# Patient Record
Sex: FEMALE | Race: WHITE | Employment: PART TIME | URBAN - METROPOLITAN AREA
[De-identification: names, ages, dates, MRNs, and addresses within clinical notes are randomized per-mention and may not be internally consistent; named-entity substitution may affect disease eponyms.]

---
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
## 2023-03-10 LAB
ABO GROUP (TYPE) IN BLOOD: NORMAL
ANTIBODY SCREEN: NORMAL
ERYTHROCYTE DISTRIBUTION WIDTH (RATIO) BY AUTOMATED COUNT: 12.2 % (ref 11.5–14.5)
ERYTHROCYTE MEAN CORPUSCULAR HEMOGLOBIN CONCENTRATION (G/DL) BY AUTOMATED: 33.7 G/DL (ref 32–36)
ERYTHROCYTE MEAN CORPUSCULAR VOLUME (FL) BY AUTOMATED COUNT: 98 FL (ref 80–100)
ERYTHROCYTES (10*6/UL) IN BLOOD BY AUTOMATED COUNT: 4.14 X10E12/L (ref 4–5.2)
HEMATOCRIT (%) IN BLOOD BY AUTOMATED COUNT: 40.4 % (ref 36–46)
HEMOGLOBIN (G/DL) IN BLOOD: 13.6 G/DL (ref 12–16)
HEPATITIS B VIRUS SURFACE AG PRESENCE IN SERUM: NONREACTIVE
HEPATITIS C VIRUS AB PRESENCE IN SERUM: NONREACTIVE
HIV 1/ 2 AG/AB SCREEN: NONREACTIVE
LEUKOCYTES (10*3/UL) IN BLOOD BY AUTOMATED COUNT: 9.7 X10E9/L (ref 4.4–11.3)
PLATELETS (10*3/UL) IN BLOOD AUTOMATED COUNT: 257 X10E9/L (ref 150–450)
REFLEX ADDED, ANEMIA PANEL: NORMAL
RH FACTOR: NORMAL
RUBELLA VIRUS IGG AB: POSITIVE
SYPHILIS TOTAL AB: NONREACTIVE

## 2023-03-13 LAB
HEMOGLOBIN A2: 3.1 %
HEMOGLOBIN A: 96.3 %
HEMOGLOBIN F: 0.6 %
HEMOGLOBIN IDENTIFICATION INTERPRETATION: NORMAL
PATH REVIEW-HGB IDENTIFICATION: NORMAL

## 2023-04-19 ENCOUNTER — APPOINTMENT (OUTPATIENT)
Dept: LAB | Facility: LAB | Age: 18
End: 2023-04-19
Payer: COMMERCIAL

## 2023-04-22 LAB — LAB MOLECULAR CA TECHNICAL NOTES: NORMAL

## 2023-07-26 LAB
ERYTHROCYTE DISTRIBUTION WIDTH (RATIO) BY AUTOMATED COUNT: 12.4 % (ref 11.5–14.5)
ERYTHROCYTE MEAN CORPUSCULAR HEMOGLOBIN CONCENTRATION (G/DL) BY AUTOMATED: 34.2 G/DL (ref 32–36)
ERYTHROCYTE MEAN CORPUSCULAR VOLUME (FL) BY AUTOMATED COUNT: 97 FL (ref 80–100)
ERYTHROCYTES (10*6/UL) IN BLOOD BY AUTOMATED COUNT: 3.75 X10E12/L (ref 4–5.2)
GLUCOSE, 1 HR SCREEN, PREG: 101 MG/DL
HEMATOCRIT (%) IN BLOOD BY AUTOMATED COUNT: 36.5 % (ref 36–46)
HEMOGLOBIN (G/DL) IN BLOOD: 12.5 G/DL (ref 12–16)
LEUKOCYTES (10*3/UL) IN BLOOD BY AUTOMATED COUNT: 11.2 X10E9/L (ref 4.4–11.3)
PLATELETS (10*3/UL) IN BLOOD AUTOMATED COUNT: 216 X10E9/L (ref 150–450)
REFLEX ADDED, ANEMIA PANEL: ABNORMAL
SYPHILIS TOTAL AB: NONREACTIVE

## 2023-10-06 ENCOUNTER — ROUTINE PRENATAL (OUTPATIENT)
Dept: OBSTETRICS AND GYNECOLOGY | Facility: CLINIC | Age: 18
End: 2023-10-06
Payer: COMMERCIAL

## 2023-10-06 VITALS — SYSTOLIC BLOOD PRESSURE: 118 MMHG | BODY MASS INDEX: 37.11 KG/M2 | DIASTOLIC BLOOD PRESSURE: 60 MMHG | WEIGHT: 190 LBS

## 2023-10-06 DIAGNOSIS — Z34.03 ENCOUNTER FOR SUPERVISION OF NORMAL FIRST PREGNANCY IN THIRD TRIMESTER (HHS-HCC): Primary | ICD-10-CM

## 2023-10-06 DIAGNOSIS — Z3A.37 37 WEEKS GESTATION OF PREGNANCY (HHS-HCC): ICD-10-CM

## 2023-10-06 PROBLEM — F41.9 ANXIETY: Status: ACTIVE | Noted: 2023-10-06

## 2023-10-06 PROCEDURE — 0501F PRENATAL FLOW SHEET: CPT | Performed by: ADVANCED PRACTICE MIDWIFE

## 2023-10-06 PROCEDURE — 87081 CULTURE SCREEN ONLY: CPT

## 2023-10-06 NOTE — PROGRESS NOTES
Assessment/Plan       Discussed expectant management vs. scheduling term IOL, considering IOL, will discuss NV.   GBS done   Reviewed s/sx of labor, warning signs, fetal movement counts, and when to call provider  Follow up in 1 week for a routine prenatal visit.    ZEFERINO Yang     Daysi Allen is a 18 y.o.  at 37w4d with a working estimated date of delivery of 10/23/2023, by Ultrasound who presents for a routine prenatal visit. She denies vaginal bleeding, leakage of fluid, decreased fetal movements, or contractions.    Her pregnancy is complicated by:  Pregnancy Problems (from 03/10/23 to present)       Problem Noted Resolved    37 weeks gestation of pregnancy 10/6/2023 by ZEFERINO Yang No    Priority:  Medium      Encounter for supervision of normal first pregnancy in third trimester 10/6/2023 by ZEFERINO Yang No    Priority:  Medium      Anxiety 10/6/2023 by ZEFERINO Yang No    Priority:  Medium               Objective   Physical Exam:   Weight: 86.2 kg (190 lb)  Expected Total Weight Gain: Could not be calculated   Pregravid BMI: Could not be calculated  BP: 118/60  Fetal Heart Rate: 138 Fundal Height (cm): 37 cm Presentation: Vertex  Dilation: 1 Effacement (%): 50 Fetal Station: -2    Postpartum Depression: Not on file        Prenatal Labs  Lab Results   Component Value Date    HGB 12.5 2023    HCT 36.5 2023    HEPBSAG NONREACTIVE 03/10/2023

## 2023-10-09 LAB — GP B STREP GENITAL QL CULT: NORMAL

## 2023-10-12 ENCOUNTER — TELEPHONE (OUTPATIENT)
Dept: OBSTETRICS AND GYNECOLOGY | Facility: CLINIC | Age: 18
End: 2023-10-12

## 2023-10-12 ENCOUNTER — ROUTINE PRENATAL (OUTPATIENT)
Dept: OBSTETRICS AND GYNECOLOGY | Facility: CLINIC | Age: 18
End: 2023-10-12
Payer: COMMERCIAL

## 2023-10-12 VITALS — BODY MASS INDEX: 37.11 KG/M2 | DIASTOLIC BLOOD PRESSURE: 70 MMHG | SYSTOLIC BLOOD PRESSURE: 124 MMHG | WEIGHT: 190 LBS

## 2023-10-12 DIAGNOSIS — Z3A.38 38 WEEKS GESTATION OF PREGNANCY (HHS-HCC): ICD-10-CM

## 2023-10-12 DIAGNOSIS — Z34.03 ENCOUNTER FOR SUPERVISION OF NORMAL PRIMIGRAVIDA IN THIRD TRIMESTER, ANTEPARTUM (HHS-HCC): Primary | ICD-10-CM

## 2023-10-12 DIAGNOSIS — Z34.90 ENCOUNTER FOR INDUCTION OF LABOR (HHS-HCC): ICD-10-CM

## 2023-10-12 PROCEDURE — 0501F PRENATAL FLOW SHEET: CPT | Performed by: NURSE PRACTITIONER

## 2023-10-12 RX ORDER — SERTRALINE HYDROCHLORIDE 100 MG/1
TABLET, FILM COATED ORAL
COMMUNITY
Start: 2023-09-24 | End: 2023-12-14 | Stop reason: SDUPTHER

## 2023-10-12 RX ORDER — CHOLECALCIFEROL (VITAMIN D3) 25 MCG
1 TABLET,CHEWABLE ORAL DAILY
COMMUNITY
Start: 2023-07-21 | End: 2023-12-14 | Stop reason: ALTCHOICE

## 2023-10-12 NOTE — PROGRESS NOTES
Assessment/Plan   Diagnoses and all orders for this visit:  Encounter for supervision of normal primigravida in third trimester, antepartum  38 weeks gestation of pregnancy  Encounter for induction of labor  -     Labor Induction; Future    Coping mechanisms and pain management options during labor discussed, patient unsure, hoping to avoid epidural  Discussed expectant management vs. scheduling term IOL, patient desires to schedule IOL for 10/20 if possible  Discussed expectations and methods used for IOL  IOL scheduled for 10/20 with arrival time .  Reviewed s/sx of labor, warning signs, fetal movement counts, and when to call provider  Follow up in 4-6 weeks for a routine postpartum visit.  ZEFERINO Almaguer, NELSY Mistry     Daysi Allen is a 18 y.o.  at 38w3d with a working estimated date of delivery of 10/23/2023, by Ultrasound who presents for a routine prenatal visit. She denies vaginal bleeding, leakage of fluid, decreased fetal movements, or contractions.    Her pregnancy is complicated by:  Pregnancy Problems (from 03/10/23 to present)       Problem Noted Resolved    Anxiety 10/6/2023 by ZEFERINO Yang No    Priority:  Medium      Overview Signed 10/12/2023  3:20 PM by ZEFERINO Miranda, APRN-CNP     Taking zoloft 100mg        Objective   Physical Exam:   Weight: 86.2 kg (190 lb)  Expected Total Weight Gain: 7 kg (15 lb)-11.5 kg (25 lb)   Pregravid BMI: 29.10  BP: 124/70  Fetal Heart Rate: 140 Fundal Height (cm): 37 cm Presentation: Vertex           Postpartum Depression: Not on file        Prenatal Labs  Lab Results   Component Value Date    HGB 12.5 2023    HCT 36.5 2023    HEPBSAG NONREACTIVE 03/10/2023     Lab Results   Component Value Date    GRPBSTREP No Group B Streptococcus (GBS) isolated 10/06/2023     Imaging  The most recent ultrasound was performed on 23 with a study GA of 27.4 and EFW 15%.

## 2023-10-20 ENCOUNTER — APPOINTMENT (OUTPATIENT)
Dept: OBSTETRICS AND GYNECOLOGY | Facility: HOSPITAL | Age: 18
DRG: 833 | End: 2023-10-20
Payer: COMMERCIAL

## 2023-10-20 ENCOUNTER — APPOINTMENT (OUTPATIENT)
Dept: OBSTETRICS AND GYNECOLOGY | Facility: CLINIC | Age: 18
End: 2023-10-20
Payer: COMMERCIAL

## 2023-10-20 ENCOUNTER — HOSPITAL ENCOUNTER (INPATIENT)
Facility: HOSPITAL | Age: 18
LOS: 1 days | Discharge: HOME | DRG: 833 | End: 2023-10-21
Attending: STUDENT IN AN ORGANIZED HEALTH CARE EDUCATION/TRAINING PROGRAM
Payer: COMMERCIAL

## 2023-10-20 DIAGNOSIS — Z34.90 ENCOUNTER FOR INDUCTION OF LABOR (HHS-HCC): ICD-10-CM

## 2023-10-21 VITALS
TEMPERATURE: 97.9 F | HEART RATE: 89 BPM | HEIGHT: 61 IN | RESPIRATION RATE: 16 BRPM | BODY MASS INDEX: 35.9 KG/M2 | OXYGEN SATURATION: 96 % | SYSTOLIC BLOOD PRESSURE: 121 MMHG | DIASTOLIC BLOOD PRESSURE: 71 MMHG

## 2023-10-21 PROBLEM — Z34.90 ENCOUNTER FOR INDUCTION OF LABOR (HHS-HCC): Status: RESOLVED | Noted: 2023-10-21 | Resolved: 2023-10-21

## 2023-10-21 PROBLEM — Z34.90 ENCOUNTER FOR INDUCTION OF LABOR (HHS-HCC): Status: ACTIVE | Noted: 2023-10-21

## 2023-10-21 LAB
ABO GROUP (TYPE) IN BLOOD: NORMAL
ANTIBODY SCREEN: NORMAL
ERYTHROCYTE [DISTWIDTH] IN BLOOD BY AUTOMATED COUNT: 12.1 % (ref 11.5–14.5)
HCT VFR BLD AUTO: 37.6 % (ref 36–46)
HGB BLD-MCNC: 12.9 G/DL (ref 12–16)
MCH RBC QN AUTO: 32.3 PG (ref 26–34)
MCHC RBC AUTO-ENTMCNC: 34.3 G/DL (ref 32–36)
MCV RBC AUTO: 94 FL (ref 80–100)
NRBC BLD-RTO: 0 /100 WBCS (ref 0–0)
PLATELET # BLD AUTO: 231 X10*3/UL (ref 150–450)
PMV BLD AUTO: 11.3 FL (ref 7.5–11.5)
RBC # BLD AUTO: 3.99 X10*6/UL (ref 4–5.2)
RH FACTOR (ANTIGEN D): NORMAL
T PALLIDUM AB SER QL: NONREACTIVE
WBC # BLD AUTO: 12.4 X10*3/UL (ref 4.4–11.3)

## 2023-10-21 PROCEDURE — 85027 COMPLETE CBC AUTOMATED: CPT | Mod: CMCLAB

## 2023-10-21 PROCEDURE — 86900 BLOOD TYPING SEROLOGIC ABO: CPT

## 2023-10-21 PROCEDURE — 1120000001 HC OB PRIVATE ROOM DAILY

## 2023-10-21 PROCEDURE — 3E0P7VZ INTRODUCTION OF HORMONE INTO FEMALE REPRODUCTIVE, VIA NATURAL OR ARTIFICIAL OPENING: ICD-10-PCS

## 2023-10-21 PROCEDURE — 36415 COLL VENOUS BLD VENIPUNCTURE: CPT

## 2023-10-21 PROCEDURE — 2500000004 HC RX 250 GENERAL PHARMACY W/ HCPCS (ALT 636 FOR OP/ED)

## 2023-10-21 PROCEDURE — 86780 TREPONEMA PALLIDUM: CPT

## 2023-10-21 PROCEDURE — 2500000001 HC RX 250 WO HCPCS SELF ADMINISTERED DRUGS (ALT 637 FOR MEDICARE OP)

## 2023-10-21 RX ORDER — OXYTOCIN/0.9 % SODIUM CHLORIDE 30/500 ML
60 PLASTIC BAG, INJECTION (ML) INTRAVENOUS ONCE AS NEEDED
Status: DISCONTINUED | OUTPATIENT
Start: 2023-10-21 | End: 2023-10-21 | Stop reason: HOSPADM

## 2023-10-21 RX ORDER — LOPERAMIDE HYDROCHLORIDE 2 MG/1
4 CAPSULE ORAL EVERY 2 HOUR PRN
Status: DISCONTINUED | OUTPATIENT
Start: 2023-10-21 | End: 2023-10-21 | Stop reason: HOSPADM

## 2023-10-21 RX ORDER — TRANEXAMIC ACID 100 MG/ML
1000 INJECTION, SOLUTION INTRAVENOUS ONCE AS NEEDED
Status: DISCONTINUED | OUTPATIENT
Start: 2023-10-21 | End: 2023-10-21 | Stop reason: HOSPADM

## 2023-10-21 RX ORDER — MISOPROSTOL 200 UG/1
800 TABLET ORAL ONCE AS NEEDED
Status: DISCONTINUED | OUTPATIENT
Start: 2023-10-21 | End: 2023-10-21 | Stop reason: HOSPADM

## 2023-10-21 RX ORDER — METOCLOPRAMIDE HYDROCHLORIDE 5 MG/ML
10 INJECTION INTRAMUSCULAR; INTRAVENOUS EVERY 6 HOURS PRN
Status: DISCONTINUED | OUTPATIENT
Start: 2023-10-21 | End: 2023-10-21 | Stop reason: HOSPADM

## 2023-10-21 RX ORDER — NIFEDIPINE 10 MG/1
10 CAPSULE ORAL ONCE AS NEEDED
Status: DISCONTINUED | OUTPATIENT
Start: 2023-10-21 | End: 2023-10-21 | Stop reason: HOSPADM

## 2023-10-21 RX ORDER — HYDRALAZINE HYDROCHLORIDE 20 MG/ML
5 INJECTION INTRAMUSCULAR; INTRAVENOUS ONCE AS NEEDED
Status: DISCONTINUED | OUTPATIENT
Start: 2023-10-21 | End: 2023-10-21 | Stop reason: HOSPADM

## 2023-10-21 RX ORDER — LABETALOL HYDROCHLORIDE 5 MG/ML
20 INJECTION, SOLUTION INTRAVENOUS ONCE AS NEEDED
Status: DISCONTINUED | OUTPATIENT
Start: 2023-10-21 | End: 2023-10-21 | Stop reason: HOSPADM

## 2023-10-21 RX ORDER — SODIUM CHLORIDE, SODIUM LACTATE, POTASSIUM CHLORIDE, CALCIUM CHLORIDE 600; 310; 30; 20 MG/100ML; MG/100ML; MG/100ML; MG/100ML
125 INJECTION, SOLUTION INTRAVENOUS CONTINUOUS
Status: DISCONTINUED | OUTPATIENT
Start: 2023-10-21 | End: 2023-10-21 | Stop reason: HOSPADM

## 2023-10-21 RX ORDER — METHYLERGONOVINE MALEATE 0.2 MG/ML
0.2 INJECTION INTRAVENOUS ONCE AS NEEDED
Status: DISCONTINUED | OUTPATIENT
Start: 2023-10-21 | End: 2023-10-21 | Stop reason: HOSPADM

## 2023-10-21 RX ORDER — LIDOCAINE HYDROCHLORIDE 10 MG/ML
30 INJECTION INFILTRATION; PERINEURAL ONCE AS NEEDED
Status: DISCONTINUED | OUTPATIENT
Start: 2023-10-21 | End: 2023-10-21 | Stop reason: HOSPADM

## 2023-10-21 RX ORDER — ONDANSETRON HYDROCHLORIDE 2 MG/ML
4 INJECTION, SOLUTION INTRAVENOUS EVERY 6 HOURS PRN
Status: DISCONTINUED | OUTPATIENT
Start: 2023-10-21 | End: 2023-10-21 | Stop reason: HOSPADM

## 2023-10-21 RX ORDER — OXYTOCIN 10 [USP'U]/ML
10 INJECTION, SOLUTION INTRAMUSCULAR; INTRAVENOUS ONCE AS NEEDED
Status: DISCONTINUED | OUTPATIENT
Start: 2023-10-21 | End: 2023-10-21 | Stop reason: HOSPADM

## 2023-10-21 RX ORDER — METOCLOPRAMIDE 10 MG/1
10 TABLET ORAL EVERY 6 HOURS PRN
Status: DISCONTINUED | OUTPATIENT
Start: 2023-10-21 | End: 2023-10-21 | Stop reason: HOSPADM

## 2023-10-21 RX ORDER — ONDANSETRON 4 MG/1
4 TABLET, FILM COATED ORAL EVERY 6 HOURS PRN
Status: DISCONTINUED | OUTPATIENT
Start: 2023-10-21 | End: 2023-10-21 | Stop reason: HOSPADM

## 2023-10-21 RX ORDER — CARBOPROST TROMETHAMINE 250 UG/ML
250 INJECTION, SOLUTION INTRAMUSCULAR ONCE AS NEEDED
Status: DISCONTINUED | OUTPATIENT
Start: 2023-10-21 | End: 2023-10-21 | Stop reason: HOSPADM

## 2023-10-21 RX ORDER — TERBUTALINE SULFATE 1 MG/ML
0.25 INJECTION SUBCUTANEOUS ONCE AS NEEDED
Status: DISCONTINUED | OUTPATIENT
Start: 2023-10-21 | End: 2023-10-21 | Stop reason: HOSPADM

## 2023-10-21 RX ADMIN — SODIUM CHLORIDE, POTASSIUM CHLORIDE, SODIUM LACTATE AND CALCIUM CHLORIDE 125 ML/HR: 600; 310; 30; 20 INJECTION, SOLUTION INTRAVENOUS at 02:09

## 2023-10-21 RX ADMIN — SODIUM CHLORIDE, POTASSIUM CHLORIDE, SODIUM LACTATE AND CALCIUM CHLORIDE 125 ML/HR: 600; 310; 30; 20 INJECTION, SOLUTION INTRAVENOUS at 06:08

## 2023-10-21 RX ADMIN — MISOPROSTOL 25 MCG: 100 TABLET ORAL at 04:10

## 2023-10-21 SDOH — SOCIAL STABILITY: SOCIAL INSECURITY: PHYSICAL ABUSE: DENIES

## 2023-10-21 SDOH — HEALTH STABILITY: MENTAL HEALTH: WERE YOU ABLE TO COMPLETE ALL THE BEHAVIORAL HEALTH SCREENINGS?: YES

## 2023-10-21 SDOH — SOCIAL STABILITY: SOCIAL INSECURITY: DOES ANYONE TRY TO KEEP YOU FROM HAVING/CONTACTING OTHER FRIENDS OR DOING THINGS OUTSIDE YOUR HOME?: NO

## 2023-10-21 SDOH — SOCIAL STABILITY: SOCIAL INSECURITY: VERBAL ABUSE: DENIES

## 2023-10-21 SDOH — ECONOMIC STABILITY: HOUSING INSECURITY: DO YOU FEEL UNSAFE GOING BACK TO THE PLACE WHERE YOU ARE LIVING?: NO

## 2023-10-21 SDOH — SOCIAL STABILITY: SOCIAL INSECURITY: HAS ANYONE EVER THREATENED TO HURT YOUR FAMILY OR YOUR PETS?: NO

## 2023-10-21 SDOH — SOCIAL STABILITY: SOCIAL INSECURITY: ABUSE SCREEN: ADULT

## 2023-10-21 SDOH — HEALTH STABILITY: MENTAL HEALTH: HAVE YOU USED ANY PRESCRIPTION DRUGS OTHER THAN PRESCRIBED IN THE PAST 12 MONTHS?: NO

## 2023-10-21 SDOH — HEALTH STABILITY: MENTAL HEALTH: HAVE YOU USED ANY SUBSTANCES (CANABIS, COCAINE, HEROIN, HALLUCINOGENS, INHALANTS, ETC.) IN THE PAST 12 MONTHS?: NO

## 2023-10-21 SDOH — HEALTH STABILITY: MENTAL HEALTH: NON-SPECIFIC ACTIVE SUICIDAL THOUGHTS (PAST 1 MONTH): NO

## 2023-10-21 SDOH — SOCIAL STABILITY: SOCIAL INSECURITY: ARE THERE ANY APPARENT SIGNS OF INJURIES/BEHAVIORS THAT COULD BE RELATED TO ABUSE/NEGLECT?: NO

## 2023-10-21 SDOH — SOCIAL STABILITY: SOCIAL INSECURITY: ARE YOU OR HAVE YOU BEEN THREATENED OR ABUSED PHYSICALLY, EMOTIONALLY, OR SEXUALLY BY ANYONE?: NO

## 2023-10-21 SDOH — HEALTH STABILITY: MENTAL HEALTH: WISH TO BE DEAD (PAST 1 MONTH): NO

## 2023-10-21 SDOH — SOCIAL STABILITY: SOCIAL INSECURITY: HAVE YOU HAD THOUGHTS OF HARMING ANYONE ELSE?: NO

## 2023-10-21 SDOH — SOCIAL STABILITY: SOCIAL INSECURITY: DO YOU FEEL ANYONE HAS EXPLOITED OR TAKEN ADVANTAGE OF YOU FINANCIALLY OR OF YOUR PERSONAL PROPERTY?: NO

## 2023-10-21 SDOH — HEALTH STABILITY: MENTAL HEALTH: SUICIDAL BEHAVIOR (LIFETIME): NO

## 2023-10-21 ASSESSMENT — LIFESTYLE VARIABLES
HOW OFTEN DO YOU HAVE A DRINK CONTAINING ALCOHOL: NEVER
AUDIT-C TOTAL SCORE: 0
AUDIT-C TOTAL SCORE: 0
HOW OFTEN DO YOU HAVE 6 OR MORE DRINKS ON ONE OCCASION: NEVER
SKIP TO QUESTIONS 9-10: 1
HOW MANY STANDARD DRINKS CONTAINING ALCOHOL DO YOU HAVE ON A TYPICAL DAY: PATIENT DOES NOT DRINK

## 2023-10-21 ASSESSMENT — PATIENT HEALTH QUESTIONNAIRE - PHQ9
2. FEELING DOWN, DEPRESSED OR HOPELESS: NOT AT ALL
1. LITTLE INTEREST OR PLEASURE IN DOING THINGS: NOT AT ALL
SUM OF ALL RESPONSES TO PHQ9 QUESTIONS 1 & 2: 0

## 2023-10-21 ASSESSMENT — PAIN SCALES - GENERAL
PAINLEVEL_OUTOF10: 0 - NO PAIN

## 2023-10-21 ASSESSMENT — ACTIVITIES OF DAILY LIVING (ADL): LACK_OF_TRANSPORTATION: NO

## 2023-10-21 NOTE — H&P
Obstetrical Admission History and Physical     Daysi Allen is a 18 y.o.  at 39w5d. MAHNAZ: 10/23/2023, by Ultrasound. Estimated fetal weight: 7lbs. She has had prenatal care with CNM .    Chief Complaint: scheduled induction of delivery    Assessment/Plan    IOL; unfavorable cervix  Anxiety  Category 1 tracing  GBS negative    Principal Problem:    Encounter for induction of labor      Pregnancy Problems (from 03/10/23 to present)       Problem Noted Resolved    Encounter for induction of labor 10/21/2023 by LANEY Clark-NEVIN No    Priority:  Medium      Anxiety 10/6/2023 by ZEFERINO Yang No    Priority:  Medium      Overview Signed 10/12/2023  3:20 PM by ZEFERINO Miranda, LANEY-CNP     Taking zoloft 100mg               Options for delivery have been discussed with the patient and she elects for an induction of labor.  Cervical ripening with cytotec, cervidil, other prostaglandin agents has been discussed.  Induction of labor with pitocin, amniotomy, cytotec, and cervical balloon have been discussed in detail. The risks, benefits, complications, alternatives, expected outcomes, potential problems during recuperation and recovery, and the risks of not performing the procedure were discussed with the patient. The patient stated understanding that the risks of delivery include, but are not limited to: death; reaction to medications; injury to bowel, bladder, ureters, uterus, cervix, vagina, and other pelvic and abdominal structures, infection; blood loss and possible need for transfusion; and potential need for surgery, including hysterectomy. The risks of injury to the infant during delivery were also discussed. All questions were answered. There was concurrence with the planned procedure, and the patient wanted to proceed.    Admit to inpatient status. I anticipate that this patient will require a stay exceeding at least 2 midnights for delivery and  postpartum.  Induction of labor.  Management of pregnancy complications, as indicated.    Subjective   Good fetal movement. Denies vaginal bleeding., C/O of occasional contractions., Denies leaking of fluid.       Reason for Induction of Labor:  Pregnancy at 39 weeks or greater for induction     Obstetrical History   OB History    Para Term  AB Living   1         0   SAB IAB Ectopic Multiple Live Births           0      # Outcome Date GA Lbr [place]/2nd Weight Sex Delivery Anes PTL Lv   1 Current                Past Medical History  Past Medical History:   Diagnosis Date    Other conditions influencing health status     Full term infant    Personal history of (healed) traumatic fracture 10/23/2017    History of fracture of nasal bone        Past Surgical History   Past Surgical History:   Procedure Laterality Date    OTHER SURGICAL HISTORY  2019    Tonsillectomy       Social History  Social History     Tobacco Use    Smoking status: Never     Passive exposure: Never    Smokeless tobacco: Never   Substance Use Topics    Alcohol use: Not on file     Substance and Sexual Activity   Drug Use Not Currently       Allergies  Amoxicillin-pot clavulanate and Peanut     Medications  Medications Prior to Admission   Medication Sig Dispense Refill Last Dose    Prenatal Multi-DHA,with vit K, 27 mg iron-800 mcg-260 mg capsule Take 1 capsule by mouth once daily.       sertraline (Zoloft) 100 mg tablet           Objective    Last Vitals  Temp Pulse Resp BP MAP O2 Sat   36.8 °C (98.2 °F) 107 18 139/84   98 %     Physical Examination  GENERAL: Examination reveals a well developed, well nourished, gravid female in no acute distress. She is alert and cooperative.  LUNGS:  normal inspiratory effort  FHR is 125 , with Accelerations, and a Category I tracing.    [place] reading:    The fetus is in a vertex presentation, determined by ultrasound  Current Estimated Fetal Weight 7lbs established by Leopold's maneuver  VAGINA:  normal appearing vagina with normal color and discharge and no lesions noted  CERVIX: 1 (1.5 cm) cm dilated, 20 (25) % effaced, -3 station; MEMBRANES are Intact  PSYCHOLOGICAL: awake and alert; oriented to person, place, and time    Lab Review  Labs in chart were reviewed.    LANEY Clark-NEVIN

## 2023-10-21 NOTE — DISCHARGE SUMMARY
Discharge Summary    Admission Date: 10/20/2023  Discharge Date: 10/21/2023    Discharge Diagnosis  Patient decision to discontinue induction of labor.     Hospital Course  Patient had 1 misoprostol at 0410 on 10/21/23 and was 1/20/-3 at that time. At 0730 she was rechecked and progressed to 1/50/-2. It was recommended to patient to continue IOL with a CRB. Patient declined and decided to go home and await labor naturally.   Dr. Melissa MD notified of discharge.       GA: 39w5d  Outcome: decision to discontinue induction of labor  Pertinent Physical Exam At Time of Discharge  GENERAL: Examination reveals a well developed, well nourished, gravid female in no acute distress. She is alert and cooperative.  LUNGS:normal respiratory effort  FHR is 130 , with Accelerations, and a Category I tracing.    [place] reading:  No contractions  The fetus is in a vertex presentation  Current Estimated Fetal Weight 7lbs established by Leopold's maneuver  CERVIX: 1 cm dilated, 50 % effaced, -2 station; MEMBRANES are Intact  PSYCHOLOGICAL: awake and alert; oriented to person, place, and time      Discharge Meds     Your medication list        ASK your doctor about these medications        Instructions Last Dose Given Next Dose Due   Prenatal Multi-DHA(with vit K) 27 mg iron-800 mcg-260 mg capsule  Generic drug: [id number]-epa-fish           sertraline 100 mg tablet  Commonly known as: Zoloft                     Complications Requiring Follow-Up  Antepartum status, follow up with weekly Prenatal Appointments.     Test Results Pending At Discharge  Pending Labs       Order Current Status    Syphillis Screen, with reflex VDRL In process            Outpatient Follow-Up  Patient currently has no prenatal appointments schedule, patient to schedule appointment.    I spent 30 minutes in the professional and overall care of this patient.      LANEY Segovia-NEVIN

## 2023-10-21 NOTE — PROGRESS NOTES
"Assessment    18 y.o.  at 39w5d  FHT Category 1  IOL; unfavorable cervix  GBS negative    Plan    25mcg miso vaginally; patient declined CRB at this time; but is open to it if SVE is relatively unchanged at next SVE  Encourage frequent position changes as tolerated  Encourage ambulation as tolerated  Maternal repositioning  Continue assessment of maternal and fetal wellbeing  Recheck as clinically indicated by maternal or fetal status    ZEFERINO Clark    Subjective:  Daysi Allen is sitting in high fowlers and comfortable    Objective:  Fetal Monitoring      Baseline FHR: 115 per minute  Variability: moderate  Accelerations: yes  Decelerations: none  TOCO: irregular, every 10 minutes    Cervical Exam: deferred from admission exam    Membrane status: intact      Vitals:    10/20/23 2345 10/20/23 2346 10/21/23 0233   BP: 139/84  133/76   Pulse: 106 107 85   Resp: 18  18   Temp: 36.8 °C (98.2 °F)     TempSrc: Tympanic     SpO2:  98% 98%   Height: 1.549 m (5' 1\")         ZEFERINO Clark    "

## 2023-10-25 ENCOUNTER — TELEPHONE (OUTPATIENT)
Dept: OBSTETRICS AND GYNECOLOGY | Facility: CLINIC | Age: 18
End: 2023-10-25
Payer: COMMERCIAL

## 2023-10-25 NOTE — TELEPHONE ENCOUNTER
Pt contacted.     Went for elective IOL last week that did not progress.    Pt declined CRB and went home.     Pt is 40.2 weeks today.    Asking to schedule another IOL.   Pt is scheduled to see kathleen montgomery tomorrow for OB visit.   Pt endorses good baby movement.    Denies ctx, lof, bleeding or pain.   Nurse will make CNM in the office aware.

## 2023-10-26 ENCOUNTER — ROUTINE PRENATAL (OUTPATIENT)
Dept: OBSTETRICS AND GYNECOLOGY | Facility: CLINIC | Age: 18
End: 2023-10-26
Payer: COMMERCIAL

## 2023-10-26 VITALS — WEIGHT: 193 LBS | DIASTOLIC BLOOD PRESSURE: 72 MMHG | BODY MASS INDEX: 36.47 KG/M2 | SYSTOLIC BLOOD PRESSURE: 118 MMHG

## 2023-10-26 DIAGNOSIS — Z3A.40 40 WEEKS GESTATION OF PREGNANCY (HHS-HCC): Primary | ICD-10-CM

## 2023-10-26 DIAGNOSIS — Z34.03 ENCOUNTER FOR SUPERVISION OF NORMAL PRIMIGRAVIDA IN THIRD TRIMESTER, ANTEPARTUM (HHS-HCC): ICD-10-CM

## 2023-10-26 DIAGNOSIS — Z36.89 NST (NON-STRESS TEST) REACTIVE (HHS-HCC): ICD-10-CM

## 2023-10-26 PROCEDURE — 59025 FETAL NON-STRESS TEST: CPT | Performed by: NURSE PRACTITIONER

## 2023-10-26 PROCEDURE — 0501F PRENATAL FLOW SHEET: CPT | Performed by: NURSE PRACTITIONER

## 2023-10-26 NOTE — PROGRESS NOTES
Assessment/Plan   Diagnoses and all orders for this visit:  40 weeks gestation of pregnancy  Encounter for supervision of normal primigravida in third trimester, antepartum  NST (non-stress test) reactive    IOL scheduled for 10/27/23  Patient counseled on methods used for IOL  Reviewed s/sx of labor, warning signs, fetal movement, and when to call provider  RTC in 4-6 weeks for routine PPV.  EZFERINO Almaguer, NELSY Mistry     Daysi Randi Allen is a 18 y.o.  at 38w3d with a working estimated date of delivery of 10/23/2023, by Ultrasound who presents for a routine prenatal visit. She denies vaginal bleeding, leakage of fluid, decreased fetal movements, or contractions.    She had IOL scheduled on 10/20/23. After receiving cytotec x1, declined further induction methods and opted to go home. Patient desires to reschedule IOL today. Patient scheduled for 10/27/23.     Her pregnancy is complicated by:  Pregnancy Problems (from 03/10/23 to present)       Problem Noted Resolved    Anxiety 10/6/2023 by ZEFERINO Yang No    Priority:  Medium      Overview Signed 10/12/2023  3:20 PM by ZEFERINO Miranda, APRN-CNP     Taking zoloft 100mg        Objective   Physical Exam:   Weight: 87.5 kg (193 lb)  Expected Total Weight Gain: 7 kg (15 lb)-11.5 kg (25 lb)   Pregravid BMI: 28.17  BP: 118/72  Fetal Heart Rate: NST      Baseline: 130  Variability: moderate  Accels: present  Decels: absent  TOCO: regular, q 8 minutes; patient reports contractions are not uncomfortable             Prenatal Labs  Lab Results   Component Value Date    HGB 12.9 10/21/2023    HCT 37.6 10/21/2023    ABO O 10/21/2023    HEPBSAG NONREACTIVE 03/10/2023     Lab Results   Component Value Date    GRPBSTREP No Group B Streptococcus (GBS) isolated 10/06/2023     Imaging  The most recent ultrasound was performed on 23 with a study GA of 27.4 and EFW 15%.

## 2023-10-26 NOTE — TELEPHONE ENCOUNTER
Giuliana contacted @  surgery scheduling office.     Pt has been scheduled for an IOL on 10/27/23 @ 6pm.    Pt has an ob appt this afternoon with kathleen.     Pt will be given date/time at visit.

## 2023-10-27 ENCOUNTER — HOSPITAL ENCOUNTER (INPATIENT)
Facility: HOSPITAL | Age: 18
LOS: 5 days | Discharge: HOME | End: 2023-11-01
Attending: OBSTETRICS & GYNECOLOGY | Admitting: ADVANCED PRACTICE MIDWIFE
Payer: COMMERCIAL

## 2023-10-27 DIAGNOSIS — Z96.0 STATUS POST PLACEMENT OF URETERAL STENT: ICD-10-CM

## 2023-10-27 PROBLEM — Z3A.40 40 WEEKS GESTATION OF PREGNANCY (HHS-HCC): Status: ACTIVE | Noted: 2023-10-27

## 2023-10-27 LAB
ABO GROUP (TYPE) IN BLOOD: NORMAL
ANTIBODY SCREEN: NORMAL
ERYTHROCYTE [DISTWIDTH] IN BLOOD BY AUTOMATED COUNT: 12.3 % (ref 11.5–14.5)
HCT VFR BLD AUTO: 38.5 % (ref 36–46)
HGB BLD-MCNC: 13.2 G/DL (ref 12–16)
MCH RBC QN AUTO: 32 PG (ref 26–34)
MCHC RBC AUTO-ENTMCNC: 34.3 G/DL (ref 32–36)
MCV RBC AUTO: 93 FL (ref 80–100)
NRBC BLD-RTO: 0 /100 WBCS (ref 0–0)
PLATELET # BLD AUTO: 229 X10*3/UL (ref 150–450)
PMV BLD AUTO: 11.2 FL (ref 7.5–11.5)
RBC # BLD AUTO: 4.12 X10*6/UL (ref 4–5.2)
RH FACTOR (ANTIGEN D): NORMAL
WBC # BLD AUTO: 9.8 X10*3/UL (ref 4.4–11.3)

## 2023-10-27 PROCEDURE — 2500000001 HC RX 250 WO HCPCS SELF ADMINISTERED DRUGS (ALT 637 FOR MEDICARE OP): Performed by: ADVANCED PRACTICE MIDWIFE

## 2023-10-27 PROCEDURE — 86850 RBC ANTIBODY SCREEN: CPT | Performed by: ADVANCED PRACTICE MIDWIFE

## 2023-10-27 PROCEDURE — 36415 COLL VENOUS BLD VENIPUNCTURE: CPT | Performed by: ADVANCED PRACTICE MIDWIFE

## 2023-10-27 PROCEDURE — 36415 COLL VENOUS BLD VENIPUNCTURE: CPT | Performed by: OBSTETRICS & GYNECOLOGY

## 2023-10-27 PROCEDURE — 86920 COMPATIBILITY TEST SPIN: CPT

## 2023-10-27 PROCEDURE — 3E0P7VZ INTRODUCTION OF HORMONE INTO FEMALE REPRODUCTIVE, VIA NATURAL OR ARTIFICIAL OPENING: ICD-10-PCS | Performed by: STUDENT IN AN ORGANIZED HEALTH CARE EDUCATION/TRAINING PROGRAM

## 2023-10-27 PROCEDURE — 85027 COMPLETE CBC AUTOMATED: CPT | Performed by: ADVANCED PRACTICE MIDWIFE

## 2023-10-27 PROCEDURE — 86780 TREPONEMA PALLIDUM: CPT | Performed by: ADVANCED PRACTICE MIDWIFE

## 2023-10-27 PROCEDURE — 1120000001 HC OB PRIVATE ROOM DAILY

## 2023-10-27 PROCEDURE — 2500000004 HC RX 250 GENERAL PHARMACY W/ HCPCS (ALT 636 FOR OP/ED): Performed by: ADVANCED PRACTICE MIDWIFE

## 2023-10-27 RX ORDER — TRANEXAMIC ACID 100 MG/ML
1000 INJECTION, SOLUTION INTRAVENOUS ONCE AS NEEDED
Status: COMPLETED | OUTPATIENT
Start: 2023-10-27 | End: 2023-10-29

## 2023-10-27 RX ORDER — LABETALOL HYDROCHLORIDE 5 MG/ML
20 INJECTION, SOLUTION INTRAVENOUS ONCE AS NEEDED
Status: DISCONTINUED | OUTPATIENT
Start: 2023-10-27 | End: 2023-10-29

## 2023-10-27 RX ORDER — SERTRALINE HYDROCHLORIDE 50 MG/1
100 TABLET, FILM COATED ORAL DAILY
Status: DISCONTINUED | OUTPATIENT
Start: 2023-10-28 | End: 2023-10-28 | Stop reason: SDUPTHER

## 2023-10-27 RX ORDER — HYDRALAZINE HYDROCHLORIDE 20 MG/ML
5 INJECTION INTRAMUSCULAR; INTRAVENOUS ONCE AS NEEDED
Status: DISCONTINUED | OUTPATIENT
Start: 2023-10-27 | End: 2023-10-29

## 2023-10-27 RX ORDER — METOCLOPRAMIDE 10 MG/1
10 TABLET ORAL EVERY 6 HOURS PRN
Status: DISCONTINUED | OUTPATIENT
Start: 2023-10-27 | End: 2023-10-29

## 2023-10-27 RX ORDER — LIDOCAINE HYDROCHLORIDE 10 MG/ML
30 INJECTION INFILTRATION; PERINEURAL ONCE AS NEEDED
Status: DISCONTINUED | OUTPATIENT
Start: 2023-10-27 | End: 2023-10-29

## 2023-10-27 RX ORDER — TERBUTALINE SULFATE 1 MG/ML
0.25 INJECTION SUBCUTANEOUS ONCE AS NEEDED
Status: DISCONTINUED | OUTPATIENT
Start: 2023-10-27 | End: 2023-10-29

## 2023-10-27 RX ORDER — METOCLOPRAMIDE HYDROCHLORIDE 5 MG/ML
10 INJECTION INTRAMUSCULAR; INTRAVENOUS EVERY 6 HOURS PRN
Status: DISCONTINUED | OUTPATIENT
Start: 2023-10-27 | End: 2023-10-29

## 2023-10-27 RX ORDER — OXYTOCIN 10 [USP'U]/ML
10 INJECTION, SOLUTION INTRAMUSCULAR; INTRAVENOUS ONCE AS NEEDED
Status: DISCONTINUED | OUTPATIENT
Start: 2023-10-27 | End: 2023-10-29

## 2023-10-27 RX ORDER — ONDANSETRON 4 MG/1
4 TABLET, FILM COATED ORAL EVERY 6 HOURS PRN
Status: DISCONTINUED | OUTPATIENT
Start: 2023-10-27 | End: 2023-10-29

## 2023-10-27 RX ORDER — LOPERAMIDE HYDROCHLORIDE 2 MG/1
4 CAPSULE ORAL EVERY 2 HOUR PRN
Status: DISCONTINUED | OUTPATIENT
Start: 2023-10-27 | End: 2023-10-29

## 2023-10-27 RX ORDER — NIFEDIPINE 10 MG/1
10 CAPSULE ORAL ONCE AS NEEDED
Status: DISCONTINUED | OUTPATIENT
Start: 2023-10-27 | End: 2023-10-29

## 2023-10-27 RX ORDER — CARBOPROST TROMETHAMINE 250 UG/ML
250 INJECTION, SOLUTION INTRAMUSCULAR ONCE AS NEEDED
Status: DISCONTINUED | OUTPATIENT
Start: 2023-10-27 | End: 2023-10-29

## 2023-10-27 RX ORDER — MISOPROSTOL 200 UG/1
800 TABLET ORAL ONCE AS NEEDED
Status: DISCONTINUED | OUTPATIENT
Start: 2023-10-27 | End: 2023-10-29

## 2023-10-27 RX ORDER — OXYTOCIN/0.9 % SODIUM CHLORIDE 30/500 ML
60 PLASTIC BAG, INJECTION (ML) INTRAVENOUS ONCE AS NEEDED
Status: DISCONTINUED | OUTPATIENT
Start: 2023-10-27 | End: 2023-10-29

## 2023-10-27 RX ORDER — SODIUM CHLORIDE, SODIUM LACTATE, POTASSIUM CHLORIDE, CALCIUM CHLORIDE 600; 310; 30; 20 MG/100ML; MG/100ML; MG/100ML; MG/100ML
125 INJECTION, SOLUTION INTRAVENOUS CONTINUOUS
Status: DISCONTINUED | OUTPATIENT
Start: 2023-10-27 | End: 2023-10-29

## 2023-10-27 RX ORDER — METHYLERGONOVINE MALEATE 0.2 MG/ML
0.2 INJECTION INTRAVENOUS ONCE AS NEEDED
Status: DISCONTINUED | OUTPATIENT
Start: 2023-10-27 | End: 2023-10-29

## 2023-10-27 RX ORDER — ONDANSETRON HYDROCHLORIDE 2 MG/ML
4 INJECTION, SOLUTION INTRAVENOUS EVERY 6 HOURS PRN
Status: DISCONTINUED | OUTPATIENT
Start: 2023-10-27 | End: 2023-10-29

## 2023-10-27 RX ADMIN — MISOPROSTOL 25 MCG: 100 TABLET ORAL at 23:40

## 2023-10-27 RX ADMIN — SODIUM CHLORIDE, POTASSIUM CHLORIDE, SODIUM LACTATE AND CALCIUM CHLORIDE 125 ML/HR: 600; 310; 30; 20 INJECTION, SOLUTION INTRAVENOUS at 22:39

## 2023-10-27 SDOH — SOCIAL STABILITY: SOCIAL INSECURITY: HAS ANYONE EVER THREATENED TO HURT YOUR FAMILY OR YOUR PETS?: NO

## 2023-10-27 SDOH — SOCIAL STABILITY: SOCIAL NETWORK: HOW OFTEN DO YOU ATTEND CHURCH OR RELIGIOUS SERVICES?: NEVER

## 2023-10-27 SDOH — SOCIAL STABILITY: SOCIAL INSECURITY: VERBAL ABUSE: DENIES

## 2023-10-27 SDOH — SOCIAL STABILITY: SOCIAL INSECURITY: WITHIN THE LAST YEAR, HAVE YOU BEEN HUMILIATED OR EMOTIONALLY ABUSED IN OTHER WAYS BY YOUR PARTNER OR EX-PARTNER?: NO

## 2023-10-27 SDOH — ECONOMIC STABILITY: FOOD INSECURITY: WITHIN THE PAST 12 MONTHS, YOU WORRIED THAT YOUR FOOD WOULD RUN OUT BEFORE YOU GOT MONEY TO BUY MORE.: NEVER TRUE

## 2023-10-27 SDOH — SOCIAL STABILITY: SOCIAL INSECURITY
WITHIN THE LAST YEAR, HAVE YOU BEEN KICKED, HIT, SLAPPED, OR OTHERWISE PHYSICALLY HURT BY YOUR PARTNER OR EX-PARTNER?: NO

## 2023-10-27 SDOH — HEALTH STABILITY: MENTAL HEALTH: HAVE YOU USED ANY SUBSTANCES (CANABIS, COCAINE, HEROIN, HALLUCINOGENS, INHALANTS, ETC.) IN THE PAST 12 MONTHS?: NO

## 2023-10-27 SDOH — SOCIAL STABILITY: SOCIAL INSECURITY: WITHIN THE LAST YEAR, HAVE YOU BEEN AFRAID OF YOUR PARTNER OR EX-PARTNER?: NO

## 2023-10-27 SDOH — SOCIAL STABILITY: SOCIAL INSECURITY
WITHIN THE LAST YEAR, HAVE TO BEEN RAPED OR FORCED TO HAVE ANY KIND OF SEXUAL ACTIVITY BY YOUR PARTNER OR EX-PARTNER?: NO

## 2023-10-27 SDOH — SOCIAL STABILITY: SOCIAL INSECURITY: ABUSE SCREEN: ADULT

## 2023-10-27 SDOH — HEALTH STABILITY: PHYSICAL HEALTH: ON AVERAGE, HOW MANY DAYS PER WEEK DO YOU ENGAGE IN MODERATE TO STRENUOUS EXERCISE (LIKE A BRISK WALK)?: 7 DAYS

## 2023-10-27 SDOH — HEALTH STABILITY: MENTAL HEALTH: HOW OFTEN DO YOU HAVE A DRINK CONTAINING ALCOHOL?: NEVER

## 2023-10-27 SDOH — HEALTH STABILITY: MENTAL HEALTH
STRESS IS WHEN SOMEONE FEELS TENSE, NERVOUS, ANXIOUS, OR CAN'T SLEEP AT NIGHT BECAUSE THEIR MIND IS TROUBLED. HOW STRESSED ARE YOU?: NOT AT ALL

## 2023-10-27 SDOH — HEALTH STABILITY: MENTAL HEALTH: WERE YOU ABLE TO COMPLETE ALL THE BEHAVIORAL HEALTH SCREENINGS?: YES

## 2023-10-27 SDOH — SOCIAL STABILITY: SOCIAL NETWORK: HOW OFTEN DO YOU GET TOGETHER WITH FRIENDS OR RELATIVES?: THREE TIMES A WEEK

## 2023-10-27 SDOH — HEALTH STABILITY: MENTAL HEALTH: NON-SPECIFIC ACTIVE SUICIDAL THOUGHTS (PAST 1 MONTH): NO

## 2023-10-27 SDOH — HEALTH STABILITY: PHYSICAL HEALTH: ON AVERAGE, HOW MANY MINUTES DO YOU ENGAGE IN EXERCISE AT THIS LEVEL?: 60 MIN

## 2023-10-27 SDOH — ECONOMIC STABILITY: INCOME INSECURITY: HOW HARD IS IT FOR YOU TO PAY FOR THE VERY BASICS LIKE FOOD, HOUSING, MEDICAL CARE, AND HEATING?: NOT HARD AT ALL

## 2023-10-27 SDOH — ECONOMIC STABILITY: TRANSPORTATION INSECURITY
IN THE PAST 12 MONTHS, HAS LACK OF TRANSPORTATION KEPT YOU FROM MEETINGS, WORK, OR FROM GETTING THINGS NEEDED FOR DAILY LIVING?: NO

## 2023-10-27 SDOH — SOCIAL STABILITY: SOCIAL INSECURITY: ARE THERE ANY APPARENT SIGNS OF INJURIES/BEHAVIORS THAT COULD BE RELATED TO ABUSE/NEGLECT?: NO

## 2023-10-27 SDOH — SOCIAL STABILITY: SOCIAL NETWORK
DO YOU BELONG TO ANY CLUBS OR ORGANIZATIONS SUCH AS CHURCH GROUPS UNIONS, FRATERNAL OR ATHLETIC GROUPS, OR SCHOOL GROUPS?: NO

## 2023-10-27 SDOH — ECONOMIC STABILITY: TRANSPORTATION INSECURITY
IN THE PAST 12 MONTHS, HAS THE LACK OF TRANSPORTATION KEPT YOU FROM MEDICAL APPOINTMENTS OR FROM GETTING MEDICATIONS?: NO

## 2023-10-27 SDOH — HEALTH STABILITY: MENTAL HEALTH: SUICIDAL BEHAVIOR (LIFETIME): NO

## 2023-10-27 SDOH — HEALTH STABILITY: MENTAL HEALTH: HOW OFTEN DO YOU HAVE 6 OR MORE DRINKS ON ONE OCCASION?: NEVER

## 2023-10-27 SDOH — SOCIAL STABILITY: SOCIAL NETWORK: ARE YOU MARRIED, WIDOWED, DIVORCED, SEPARATED, NEVER MARRIED, OR LIVING WITH A PARTNER?: MARRIED

## 2023-10-27 SDOH — SOCIAL STABILITY: SOCIAL INSECURITY: ARE YOU OR HAVE YOU BEEN THREATENED OR ABUSED PHYSICALLY, EMOTIONALLY, OR SEXUALLY BY ANYONE?: NO

## 2023-10-27 SDOH — ECONOMIC STABILITY: FOOD INSECURITY: WITHIN THE PAST 12 MONTHS, THE FOOD YOU BOUGHT JUST DIDN'T LAST AND YOU DIDN'T HAVE MONEY TO GET MORE.: NEVER TRUE

## 2023-10-27 SDOH — SOCIAL STABILITY: SOCIAL NETWORK: HOW OFTEN DO YOU ATTENT MEETINGS OF THE CLUB OR ORGANIZATION YOU BELONG TO?: 1 TO 4 TIMES PER YEAR

## 2023-10-27 SDOH — SOCIAL STABILITY: SOCIAL INSECURITY: DOES ANYONE TRY TO KEEP YOU FROM HAVING/CONTACTING OTHER FRIENDS OR DOING THINGS OUTSIDE YOUR HOME?: NO

## 2023-10-27 SDOH — SOCIAL STABILITY: SOCIAL INSECURITY: DO YOU FEEL ANYONE HAS EXPLOITED OR TAKEN ADVANTAGE OF YOU FINANCIALLY OR OF YOUR PERSONAL PROPERTY?: NO

## 2023-10-27 SDOH — HEALTH STABILITY: MENTAL HEALTH: WISH TO BE DEAD (PAST 1 MONTH): NO

## 2023-10-27 SDOH — SOCIAL STABILITY: SOCIAL NETWORK: IN A TYPICAL WEEK, HOW MANY TIMES DO YOU TALK ON THE PHONE WITH FAMILY, FRIENDS, OR NEIGHBORS?: THREE TIMES A WEEK

## 2023-10-27 SDOH — SOCIAL STABILITY: SOCIAL INSECURITY: HAVE YOU HAD THOUGHTS OF HARMING ANYONE ELSE?: NO

## 2023-10-27 SDOH — SOCIAL STABILITY: SOCIAL INSECURITY: PHYSICAL ABUSE: DENIES

## 2023-10-27 SDOH — HEALTH STABILITY: MENTAL HEALTH: HOW MANY STANDARD DRINKS CONTAINING ALCOHOL DO YOU HAVE ON A TYPICAL DAY?: PATIENT DOES NOT DRINK

## 2023-10-27 SDOH — HEALTH STABILITY: MENTAL HEALTH: HAVE YOU USED ANY PRESCRIPTION DRUGS OTHER THAN PRESCRIBED IN THE PAST 12 MONTHS?: NO

## 2023-10-27 SDOH — ECONOMIC STABILITY: HOUSING INSECURITY: DO YOU FEEL UNSAFE GOING BACK TO THE PLACE WHERE YOU ARE LIVING?: NO

## 2023-10-27 ASSESSMENT — ACTIVITIES OF DAILY LIVING (ADL)
ADEQUATE_TO_COMPLETE_ADL: YES
GROOMING: INDEPENDENT
TOILETING: INDEPENDENT
FEEDING YOURSELF: INDEPENDENT
HEARING - LEFT EAR: FUNCTIONAL
PATIENT'S MEMORY ADEQUATE TO SAFELY COMPLETE DAILY ACTIVITIES?: YES
HEARING - RIGHT EAR: FUNCTIONAL
BATHING: INDEPENDENT
WALKS IN HOME: INDEPENDENT
JUDGMENT_ADEQUATE_SAFELY_COMPLETE_DAILY_ACTIVITIES: YES
DRESSING YOURSELF: INDEPENDENT

## 2023-10-27 ASSESSMENT — PATIENT HEALTH QUESTIONNAIRE - PHQ9
SUM OF ALL RESPONSES TO PHQ9 QUESTIONS 1 & 2: 0
1. LITTLE INTEREST OR PLEASURE IN DOING THINGS: NOT AT ALL
2. FEELING DOWN, DEPRESSED OR HOPELESS: NOT AT ALL

## 2023-10-27 ASSESSMENT — LIFESTYLE VARIABLES
HOW OFTEN DO YOU HAVE A DRINK CONTAINING ALCOHOL: NEVER
HOW MANY STANDARD DRINKS CONTAINING ALCOHOL DO YOU HAVE ON A TYPICAL DAY: PATIENT DOES NOT DRINK
AUDIT-C TOTAL SCORE: 0
AUDIT-C TOTAL SCORE: 0
SKIP TO QUESTIONS 9-10: 1
HOW OFTEN DO YOU HAVE 6 OR MORE DRINKS ON ONE OCCASION: NEVER
SKIP TO QUESTIONS 9-10: 1
AUDIT-C TOTAL SCORE: 0

## 2023-10-27 ASSESSMENT — PAIN SCALES - GENERAL
PAINLEVEL_OUTOF10: 0 - NO PAIN
PAINLEVEL_OUTOF10: 2

## 2023-10-28 ENCOUNTER — ANESTHESIA EVENT (OUTPATIENT)
Dept: OBSTETRICS AND GYNECOLOGY | Facility: HOSPITAL | Age: 18
End: 2023-10-28
Payer: COMMERCIAL

## 2023-10-28 ENCOUNTER — ANESTHESIA (OUTPATIENT)
Dept: OBSTETRICS AND GYNECOLOGY | Facility: HOSPITAL | Age: 18
End: 2023-10-28
Payer: COMMERCIAL

## 2023-10-28 LAB
ALBUMIN SERPL BCP-MCNC: 3.5 G/DL (ref 3.4–5)
ALP SERPL-CCNC: 168 U/L (ref 33–110)
ALT SERPL W P-5'-P-CCNC: 14 U/L (ref 7–45)
ANION GAP SERPL CALC-SCNC: 17 MMOL/L (ref 10–20)
AST SERPL W P-5'-P-CCNC: 20 U/L (ref 9–39)
BILIRUB SERPL-MCNC: 0.7 MG/DL (ref 0–1.2)
BUN SERPL-MCNC: 13 MG/DL (ref 6–23)
CALCIUM SERPL-MCNC: 8.9 MG/DL (ref 8.6–10.6)
CHLORIDE SERPL-SCNC: 105 MMOL/L (ref 98–107)
CO2 SERPL-SCNC: 18 MMOL/L (ref 21–32)
CREAT SERPL-MCNC: 0.97 MG/DL (ref 0.5–1.05)
ERYTHROCYTE [DISTWIDTH] IN BLOOD BY AUTOMATED COUNT: 12.3 % (ref 11.5–14.5)
GFR SERPL CREATININE-BSD FRML MDRD: 86 ML/MIN/1.73M*2
GLUCOSE SERPL-MCNC: 87 MG/DL (ref 74–99)
HCT VFR BLD AUTO: 38.7 % (ref 36–46)
HGB BLD-MCNC: 12.8 G/DL (ref 12–16)
MCH RBC QN AUTO: 31.9 PG (ref 26–34)
MCHC RBC AUTO-ENTMCNC: 33.1 G/DL (ref 32–36)
MCV RBC AUTO: 97 FL (ref 80–100)
NRBC BLD-RTO: 0 /100 WBCS (ref 0–0)
PLATELET # BLD AUTO: 202 X10*3/UL (ref 150–450)
PMV BLD AUTO: 11.2 FL (ref 7.5–11.5)
POTASSIUM SERPL-SCNC: 4.3 MMOL/L (ref 3.5–5.3)
PROT SERPL-MCNC: 6.1 G/DL (ref 6.4–8.2)
RBC # BLD AUTO: 4.01 X10*6/UL (ref 4–5.2)
SODIUM SERPL-SCNC: 136 MMOL/L (ref 136–145)
T PALLIDUM AB SER QL: NONREACTIVE
WBC # BLD AUTO: 18 X10*3/UL (ref 4.4–11.3)

## 2023-10-28 PROCEDURE — 80053 COMPREHEN METABOLIC PANEL: CPT

## 2023-10-28 PROCEDURE — 3E033VJ INTRODUCTION OF OTHER HORMONE INTO PERIPHERAL VEIN, PERCUTANEOUS APPROACH: ICD-10-PCS | Performed by: STUDENT IN AN ORGANIZED HEALTH CARE EDUCATION/TRAINING PROGRAM

## 2023-10-28 PROCEDURE — 2500000005 HC RX 250 GENERAL PHARMACY W/O HCPCS

## 2023-10-28 PROCEDURE — 2500000004 HC RX 250 GENERAL PHARMACY W/ HCPCS (ALT 636 FOR OP/ED): Performed by: ADVANCED PRACTICE MIDWIFE

## 2023-10-28 PROCEDURE — 36430 TRANSFUSION BLD/BLD COMPNT: CPT | Mod: GC | Performed by: STUDENT IN AN ORGANIZED HEALTH CARE EDUCATION/TRAINING PROGRAM

## 2023-10-28 PROCEDURE — 01961 ANES CESAREAN DELIVERY ONLY: CPT | Performed by: ANESTHESIOLOGY

## 2023-10-28 PROCEDURE — 51701 INSERT BLADDER CATHETER: CPT

## 2023-10-28 PROCEDURE — 2500000001 HC RX 250 WO HCPCS SELF ADMINISTERED DRUGS (ALT 637 FOR MEDICARE OP): Performed by: ADVANCED PRACTICE MIDWIFE

## 2023-10-28 PROCEDURE — 2500000005 HC RX 250 GENERAL PHARMACY W/O HCPCS: Performed by: STUDENT IN AN ORGANIZED HEALTH CARE EDUCATION/TRAINING PROGRAM

## 2023-10-28 PROCEDURE — 1120000001 HC OB PRIVATE ROOM DAILY

## 2023-10-28 PROCEDURE — 10907ZC DRAINAGE OF AMNIOTIC FLUID, THERAPEUTIC FROM PRODUCTS OF CONCEPTION, VIA NATURAL OR ARTIFICIAL OPENING: ICD-10-PCS | Performed by: STUDENT IN AN ORGANIZED HEALTH CARE EDUCATION/TRAINING PROGRAM

## 2023-10-28 PROCEDURE — 85027 COMPLETE CBC AUTOMATED: CPT

## 2023-10-28 RX ORDER — LIDOCAINE 560 MG/1
1 PATCH PERCUTANEOUS; TOPICAL; TRANSDERMAL DAILY
Status: DISCONTINUED | OUTPATIENT
Start: 2023-10-28 | End: 2023-10-29

## 2023-10-28 RX ORDER — LORAZEPAM 2 MG/ML
1 INJECTION INTRAMUSCULAR ONCE
Status: DISCONTINUED | OUTPATIENT
Start: 2023-10-28 | End: 2023-10-28

## 2023-10-28 RX ORDER — FENTANYL/BUPIVACAINE/NS/PF 2MCG/ML-.1
0-54 PLASTIC BAG, INJECTION (ML) INJECTION CONTINUOUS
Status: DISCONTINUED | OUTPATIENT
Start: 2023-10-28 | End: 2023-11-01 | Stop reason: HOSPADM

## 2023-10-28 RX ORDER — CYCLOBENZAPRINE HCL 10 MG
5 TABLET ORAL 3 TIMES DAILY
Status: DISCONTINUED | OUTPATIENT
Start: 2023-10-28 | End: 2023-10-29

## 2023-10-28 RX ORDER — OXYTOCIN/0.9 % SODIUM CHLORIDE 30/500 ML
2-30 PLASTIC BAG, INJECTION (ML) INTRAVENOUS CONTINUOUS
Status: DISCONTINUED | OUTPATIENT
Start: 2023-10-28 | End: 2023-10-29

## 2023-10-28 RX ORDER — FENTANYL/BUPIVACAINE/NS/PF 2MCG/ML-.1
PLASTIC BAG, INJECTION (ML) INJECTION AS NEEDED
Status: DISCONTINUED | OUTPATIENT
Start: 2023-10-28 | End: 2023-10-29

## 2023-10-28 RX ORDER — LIDOCAINE HCL/EPINEPHRINE/PF 2%-1:200K
VIAL (ML) INJECTION AS NEEDED
Status: DISCONTINUED | OUTPATIENT
Start: 2023-10-28 | End: 2023-10-29

## 2023-10-28 RX ORDER — ACETAMINOPHEN 650 MG/1
650 SUPPOSITORY RECTAL EVERY 4 HOURS PRN
Status: DISCONTINUED | OUTPATIENT
Start: 2023-10-28 | End: 2023-10-29

## 2023-10-28 RX ORDER — ACETAMINOPHEN 325 MG/1
650 TABLET ORAL EVERY 4 HOURS PRN
Status: DISCONTINUED | OUTPATIENT
Start: 2023-10-28 | End: 2023-10-29

## 2023-10-28 RX ORDER — ACETAMINOPHEN 160 MG/5ML
650 SOLUTION ORAL EVERY 4 HOURS PRN
Status: DISCONTINUED | OUTPATIENT
Start: 2023-10-28 | End: 2023-10-29

## 2023-10-28 RX ORDER — SERTRALINE HYDROCHLORIDE 100 MG/1
100 TABLET, FILM COATED ORAL DAILY
Status: DISCONTINUED | OUTPATIENT
Start: 2023-10-28 | End: 2023-11-01 | Stop reason: HOSPADM

## 2023-10-28 RX ADMIN — ONDANSETRON 4 MG: 2 INJECTION INTRAMUSCULAR; INTRAVENOUS at 11:25

## 2023-10-28 RX ADMIN — Medication 5 ML: at 03:06

## 2023-10-28 RX ADMIN — ACETAMINOPHEN 650 MG: 325 TABLET ORAL at 11:00

## 2023-10-28 RX ADMIN — Medication 12 ML/HR: at 22:42

## 2023-10-28 RX ADMIN — SODIUM CHLORIDE, POTASSIUM CHLORIDE, SODIUM LACTATE AND CALCIUM CHLORIDE 500 ML: 600; 310; 30; 20 INJECTION, SOLUTION INTRAVENOUS at 10:36

## 2023-10-28 RX ADMIN — LIDOCAINE 1 PATCH: 4 PATCH TOPICAL at 20:12

## 2023-10-28 RX ADMIN — Medication 14 ML/HR: at 03:07

## 2023-10-28 RX ADMIN — LIDOCAINE HYDROCHLORIDE,EPINEPHRINE BITARTRATE 5 ML: 20; .005 INJECTION, SOLUTION EPIDURAL; INFILTRATION; INTRACAUDAL; PERINEURAL at 05:17

## 2023-10-28 RX ADMIN — Medication 12 ML/HR: at 11:42

## 2023-10-28 RX ADMIN — Medication 2 MILLI-UNITS/MIN: at 12:41

## 2023-10-28 RX ADMIN — Medication 5 ML: at 03:04

## 2023-10-28 RX ADMIN — CYCLOBENZAPRINE 5 MG: 10 TABLET, FILM COATED ORAL at 11:11

## 2023-10-28 RX ADMIN — SERTRALINE 100 MG: 100 TABLET, FILM COATED ORAL at 14:46

## 2023-10-28 RX ADMIN — SODIUM CHLORIDE, POTASSIUM CHLORIDE, SODIUM LACTATE AND CALCIUM CHLORIDE 125 ML/HR: 600; 310; 30; 20 INJECTION, SOLUTION INTRAVENOUS at 17:56

## 2023-10-28 RX ADMIN — SODIUM CHLORIDE, POTASSIUM CHLORIDE, SODIUM LACTATE AND CALCIUM CHLORIDE 125 ML/HR: 600; 310; 30; 20 INJECTION, SOLUTION INTRAVENOUS at 02:51

## 2023-10-28 RX ADMIN — SODIUM CHLORIDE, POTASSIUM CHLORIDE, SODIUM LACTATE AND CALCIUM CHLORIDE 125 ML/HR: 600; 310; 30; 20 INJECTION, SOLUTION INTRAVENOUS at 19:29

## 2023-10-28 RX ADMIN — CYCLOBENZAPRINE 5 MG: 10 TABLET, FILM COATED ORAL at 14:33

## 2023-10-28 SDOH — HEALTH STABILITY: MENTAL HEALTH: CURRENT SMOKER: 0

## 2023-10-28 ASSESSMENT — PAIN SCALES - GENERAL
PAINLEVEL_OUTOF10: 0 - NO PAIN
PAINLEVEL_OUTOF10: 2
PAINLEVEL_OUTOF10: 6
PAINLEVEL_OUTOF10: 4
PAINLEVEL_OUTOF10: 0 - NO PAIN
PAINLEVEL_OUTOF10: 7
PAINLEVEL_OUTOF10: 4
PAINLEVEL_OUTOF10: 2
PAINLEVEL_OUTOF10: 2
PAINLEVEL_OUTOF10: 0 - NO PAIN
PAINLEVEL_OUTOF10: 7
PAINLEVEL_OUTOF10: 4
PAINLEVEL_OUTOF10: 2
PAINLEVEL_OUTOF10: 10 - WORST POSSIBLE PAIN
PAINLEVEL_OUTOF10: 0 - NO PAIN
PAINLEVEL_OUTOF10: 0 - NO PAIN
PAINLEVEL_OUTOF10: 4
PAINLEVEL_OUTOF10: 4
PAINLEVEL_OUTOF10: 5 - MODERATE PAIN
PAINLEVEL_OUTOF10: 10 - WORST POSSIBLE PAIN
PAINLEVEL_OUTOF10: 5 - MODERATE PAIN
PAINLEVEL_OUTOF10: 4
PAINLEVEL_OUTOF10: 0 - NO PAIN
PAINLEVEL_OUTOF10: 2
PAINLEVEL_OUTOF10: 6
PAINLEVEL_OUTOF10: 0 - NO PAIN
PAINLEVEL_OUTOF10: 0 - NO PAIN
PAINLEVEL_OUTOF10: 2
PAINLEVEL_OUTOF10: 5 - MODERATE PAIN
PAINLEVEL_OUTOF10: 0 - NO PAIN
PAINLEVEL_OUTOF10: 7
PAINLEVEL_OUTOF10: 10 - WORST POSSIBLE PAIN
PAINLEVEL_OUTOF10: 4

## 2023-10-28 ASSESSMENT — ACTIVITIES OF DAILY LIVING (ADL): LACK_OF_TRANSPORTATION: NO

## 2023-10-28 NOTE — PROGRESS NOTES
Called to room by nurse secondary to repetitive decelerations to 90s. Patient was turned to left lateral position and fluid bolus infusing. Patient vomited just before decelerations started. Had recent epidural dose; normotensive.     S: Patient feels very numb after recent epidural dose which is causing her some anxiety    O:  FHR returned to baseline of 130s with moderate variability and +accels; variable decles present   CAT II FHR tracing   Contractions irregular of moderate intensity; uterine resting tone is soft  Cervix: 4cm/50%/-2    A: IUP at 40.5 weeks       IOL       GBS neg     P: Continue to monitor fetal status     LANEY Bui-NEVIN

## 2023-10-28 NOTE — PROGRESS NOTES
S: Pt feeling some mild, irregular contractions.  Family at bedside providing support.    O: Cervical Exam: 3cm/70%/-2  Presentation: vtx per ultrasund  FHR     Baseline: 130s     Variability: moderate     Accels: present     Decels: absent     Category: I  TOCO: irregular mild contractions  Membrane status: Intact           A:  IUP @ 40.4 weeks  GBS neg  Category 1 tracing  Labor phase: Induction    P:  Continue cytotec per protocol; second dose placed at 2340  Continue labor support  Pain control as needed  Anticipate      ZEFERINO Bui

## 2023-10-28 NOTE — CARE PLAN
The patient's goals for the shift include      The clinical goals for the shift include Healthy mom, healthy baby    Over the shift, the patient did not make progress toward the following goals. Barriers to progression include none. Recommendations to address these barriers include n/a.

## 2023-10-28 NOTE — PROGRESS NOTES
Intrapartum Progress Note    Assessment   Daysi Allen is a 18 y.o.  at 40w5d. MAHNAZ: 10/23/2023, by Ultrasound.   FHT Category 2  Latent labor  AROM - MSF  Plan   Augmentation with pitocin per protocol  Discussed with patient MSF and management at birth with pediatrics in the room  Anticipate NSVB  Will augment with Pitocin when appropriate    ZEFERINO Hartley    Fidelia Tavarez and her family are at her bedside.  She had some pain management issues now resolved by anesthesia.  Her bladder was recently emptied and she agrees to a VE and AROM  Pregnancy complicated by:  Pregnancy Problems (from 03/10/23 to present)       Problem Noted Resolved    40 weeks gestation of pregnancy 10/27/2023 by ZEFERINO Raya No    Priority:  Medium      Encounter for induction of labor 10/21/2023 by ZEFERINO Clark No    Priority:  Medium      Anxiety 10/6/2023 by ZEFERINO Yang No    Priority:  Medium      Overview Signed 10/12/2023  3:20 PM by ZEFERINO Miranda, APRN-CNP     Taking zoloft 100mg               Principal Problem:    Encounter for induction of labor  Active Problems:    40 weeks gestation of pregnancy      Objective   Last Vitals:  Temp Pulse Resp BP MAP Pulse Ox   36.8 °C (98.2 °F) 94 18 128/86   99 % (Simultaneous filing. User may not have seen previous data.)     Vitals Min/Max Last 24 Hours:  Temp  Min: 36.6 °C (97.9 °F)  Max: 37.1 °C (98.8 °F)  Pulse  Min: 77  Max: 109  Resp  Min: 16  Max: 18  BP  Min: 106/56  Max: 139/95    Intake/Output:    Intake/Output Summary (Last 24 hours) at 10/28/2023 1010  Last data filed at 10/28/2023 0930  Gross per 24 hour   Intake 1866.25 ml   Output 600 ml   Net 1266.25 ml       Physical Examination:  GENERAL: Examination reveals a well developed, well nourished, gravid female in no acute distress. She is alert and cooperative.  ABDOMEN: soft, gravid, nontender, nondistended, no abnormal masses, no  epigastric pain  FHR is  , with Early decelerations, Variable decelerations, and a Category I tracing.    [place] reading:    VAGINA: normal appearing vagina with normal color and discharge and no lesions noted  CERVIX: 4 cm dilated, 80 % effaced, -2 station; MEMBRANES are AROM  EXTREMITIES: no redness or tenderness in the calves or thighs, no edema    Fetal Monitoring      Baseline FHR: 135 per minute  Variability: moderate  Accelerations: no  Decelerations: none  TOCO: irregular, every 2 minutes    Lab Review:  Labs in chart were reviewed.

## 2023-10-28 NOTE — H&P
Obstetrical Admission History and Physical     Daysi Allen is a 18 y.o.  at 40w4d. MAHNAZ: 10/23/2023, by 7.4 week Ultrasound on 3/10/23. Estimated fetal weight: 7lbs. She has had prenatal care with Damaris Khan CNM .    Chief Complaint: Scheduled Induction    Assessment/Plan    17yo G1 at 40.4 weeks by US  Induction of labor   Vtx presentation confirmed by ultrasound  Uncomplicated pregnancy course  GBS neg  O+    P:   Admit to labor and delivery   Continuous electronic fetal monitoring   Cytotec per protocol   Pain management as needed     Principal Problem:    Encounter for induction of labor  Active Problems:    40 weeks gestation of pregnancy      Pregnancy Problems (from 03/10/23 to present)       Problem Noted Resolved    40 weeks gestation of pregnancy 10/27/2023 by ZEFERINO Raya No    Priority:  Medium      Encounter for induction of labor 10/21/2023 by ZEFERINO Clark No    Priority:  Medium      Anxiety 10/6/2023 by ZEFERINO Yang No    Priority:  Medium      Overview Signed 10/12/2023  3:20 PM by ZEFERINO Miranda, LANEY-CNP     Taking zoloft 100mg               Options for delivery have been discussed with the patient and she elects for an induction of labor.  Cervical ripening with cytotec, cervidil, other prostaglandin agents has been discussed.  Induction of labor with pitocin, amniotomy, cytotec, and cervical balloon have been discussed in detail. The risks, benefits, complications, alternatives, expected outcomes, potential problems during recuperation and recovery, and the risks of not performing the procedure were discussed with the patient. The patient stated understanding that the risks of delivery include, but are not limited to: death; reaction to medications; injury to bowel, bladder, ureters, uterus, cervix, vagina, and other pelvic and abdominal structures, infection; blood loss and possible need for transfusion;  and potential need for surgery, including hysterectomy. The risks of injury to the infant during delivery were also discussed. All questions were answered. There was concurrence with the planned procedure, and the patient wanted to proceed.    Admit to inpatient status. I anticipate that this patient will require a stay exceeding at least 2 midnights for delivery and postpartum.  Induction of labor.  Management of pregnancy complications, as indicated.    Subjective   Good fetal movement. Denies vaginal bleeding., Denies contractions.     Reason for Induction of Labor:  Postdates pregnancy         Obstetrical History   OB History    Para Term  AB Living   1         0   SAB IAB Ectopic Multiple Live Births           0      # Outcome Date GA Lbr Alverto/2nd Weight Sex Delivery Anes PTL Lv   1 Current                Past Medical History  Past Medical History:   Diagnosis Date    Other conditions influencing health status     Full term infant    Personal history of (healed) traumatic fracture 10/23/2017    History of fracture of nasal bone        Past Surgical History   Past Surgical History:   Procedure Laterality Date    OTHER SURGICAL HISTORY  2019    Tonsillectomy       Social History  Social History     Tobacco Use    Smoking status: Never     Passive exposure: Never    Smokeless tobacco: Never   Substance Use Topics    Alcohol use: Not on file     Substance and Sexual Activity   Drug Use Not Currently       Allergies  Amoxicillin-pot clavulanate and Peanut     Medications  Medications Prior to Admission   Medication Sig Dispense Refill Last Dose    Prenatal Multi-DHA,with vit K, 27 mg iron-800 mcg-260 mg capsule Take 1 capsule by mouth once daily.   10/27/2023 at 1800    sertraline (Zoloft) 100 mg tablet    10/27/2023 at 1800       Objective    Last Vitals  Temp Pulse Resp BP MAP O2 Sat   36.7 °C (98.1 °F) 99 18 124/66   98 %     Physical Examination  GENERAL: Examination reveals a well developed,  well nourished, gravid female in no acute distress. She is alert and cooperative.  LUNGS:  Unlabored breathing  ABDOMEN: soft, gravid, nontender, nondistended, no abnormal masses, no epigastric pain  FHR is  , with Accelerations, and a   tracing.    [place] reading:    The fetus is in a vertex presentation, determined by ultrasound  Current Estimated Fetal Weight 7 established by Leopold's maneuver  CERVIX: 2 cm dilated, 50 % effaced, -2 station; MEMBRANES are Intact  EXTREMITIES: no redness or tenderness in the calves or thighs, no edema  SKIN: normal coloration and turgor, no rashes  NEUROLOGICAL: DTRs normal and symmetrical  PSYCHOLOGICAL: awake and alert; oriented to person, place, and time    Lab Review  Labs in chart were reviewed.

## 2023-10-28 NOTE — PROGRESS NOTES
S: Pt is comfortable with epidural in place. Family at bedside.     O: Cervical Exam: /-2  Presentation: vtx  FHR     Baseline: 130s     Variability: moderate      Accels: present     Decels: absent     Category: I  TOCO: contractions every 1-3 minutes of moderate intensity  Membrane status: intact    A:  IUP @ 40.5  GBS neg  Category 1 tracing  Labor phase: latent    P:  Continue labor support  Pitocin as needed  Anticipate      LANEY Bui-NEVIN

## 2023-10-28 NOTE — H&P
Obstetrical Admission History and Physical     Daysi Allen is a 18 y.o.  at 40w4d. MAHNAZ: 10/23/2023, by Ultrasound. Estimated fetal weight: 7lbs. She has had prenatal care with Tiffany Khan CNM .    Assessment    Daysi Allen is a 18 y.o.  at 40w4d. MAHNAZ: 10/23/2023, by Ultrasound.   FHT Category 1  Induction of labor    Plan    Labor Admission Plan: Admit to Labor & Delivery, Monitor vital signs per unit protocol, Routine labs ordered, Encourage frequent position changes and ambulation as tolerated, IOL plan: cytotec and pitocin as needed, Pain management per patient request, Continue assessment of maternal and fetal well-being, Recheck as clinically indicted by maternal or fetal status, Dr. James aware of admission and plan of care    ZEFERINO Raya    Subjective     Chief Complaint: Scheduled Induction    Daysi is here for induciton of labor. Good fetal movement. Denies vaginal bleeding., Denies contractions.     Assessment/Plan   17yo G1 at 40.4 weeks by US  Induction of labor   Vtx presentation confirmed by ultrasound  Uncomplicated pregnancy course  GBS neg  O+     P:   Admit to labor and delivery   Continuous electronic fetal monitoring   Cytotec per protocol   Pain management as needed      Principal Problem:  Pregnancy Problems (from 03/10/23 to present)       Problem Noted Resolved    40 weeks gestation of pregnancy 10/27/2023 by ZEFERINO Raya No    Priority:  Medium      Encounter for induction of labor 10/21/2023 by ZEFERINO Clark No    Priority:  Medium      Anxiety 10/6/2023 by ZEFERINO Yang No    Priority:  Medium      Overview Signed 10/12/2023  3:20 PM by ZEFERINO Miranda, APRN-CNP     Taking zoloft 100mg                  Obstetrical History   OB History    Para Term  AB Living   1         0   SAB IAB Ectopic Multiple Live Births           0      # Outcome Date GA Lbr Alverto/2nd  Weight Sex Delivery Anes PTL Lv   1 Current                Past Medical History  Past Medical History:   Diagnosis Date    Other conditions influencing health status     Full term infant    Personal history of (healed) traumatic fracture 10/23/2017    History of fracture of nasal bone        Past Surgical History   Past Surgical History:   Procedure Laterality Date    OTHER SURGICAL HISTORY  04/01/2019    Tonsillectomy       Social History  Social History     Tobacco Use    Smoking status: Never     Passive exposure: Never    Smokeless tobacco: Never   Substance Use Topics    Alcohol use: Not on file     Substance and Sexual Activity   Drug Use Not Currently       Allergies  Amoxicillin-pot clavulanate and Peanut     Medications  Medications Prior to Admission   Medication Sig Dispense Refill Last Dose    Prenatal Multi-DHA,with vit K, 27 mg iron-800 mcg-260 mg capsule Take 1 capsule by mouth once daily.   10/27/2023 at 1800    sertraline (Zoloft) 100 mg tablet    10/27/2023 at 1800       Objective    Last Vitals  Temp Pulse Resp BP MAP O2 Sat   36.7 °C (98.1 °F) 99 18 124/66   98 %     Physical Examination    GENERAL: Examination reveals a well developed, well nourished, gravid female in no acute distress. She is alert and cooperative.  LUNGS: clear to auscultation bilaterally  HEART:  regular pulse  ABDOMEN: soft, gravid, nontender, nondistended, no abnormal masses, no epigastric pain  FHR is  , with Accelerations, and a Category I tracing.    [place] reading:    CERVIX: 3 cm dilated, 70 % effaced, -2 station; MEMBRANES are Intact  EXTREMITIES: no redness or tenderness in the calves or thighs, no edema  SKIN: normal coloration and turgor, no rashes  NEUROLOGICAL: alert, oriented, normal speech, no focal findings or movement disorder noted  PSYCHOLOGICAL: awake and alert; oriented to person, place, and time    Fetal Monitoring      Baseline FHR: 130   per minute  Variability: moderate  Accelerations:  yes  Decelerations: none  TOCO: none      Cervical Exam:  2 cm dilated, 50 effaced, -2 station    Membrane status: intact      Lab Review  Labs in chart were reviewed.

## 2023-10-28 NOTE — ANESTHESIA PROCEDURE NOTES
Epidural Block    Patient location during procedure: OB  Start time: 10/28/2023 2:55 AM  Reason for block: labor analgesia  Staffing  Performed: resident   Authorized by: Polly Angel MD    Performed by: Polly Angel MD    Preanesthetic Checklist  Completed: patient identified, IV checked, site marked, risks and benefits discussed, surgical consent, monitors and equipment checked, pre-op evaluation, timeout performed and sterile techniques followed  Block Timeout  RN/Licensed healthcare professional reads aloud to the Anesthesia provider and entire team: Patient identity, procedure with side and site, patient position, and as applicable the availability of implants/special equipment/special requirements.  Patient on coagulant treatment: no  Timeout performed at: 10/28/2023 2:58 AM  Block Placement  Patient position: sitting  Prep: ChloraPrep  Sterility prep: cap, drape, gloves, hand and mask  Sedation level: no sedation  Patient monitoring: continuous pulse oximetry, blood pressure and heart rate  Approach: midline  Local numbing: lidocaine 1% to skin and subcutaneous tissues  Vertebral space: lumbar  Lumbar location: L3-L4  Epidural  Loss of resistance technique: saline  Guidance: landmark technique        Needle  Needle type: Tuohy   Needle gauge: 18  Needle length: 10 cm  Needle insertion depth: 5 cm  Catheter type: stylet  Catheter size: 20 G  Catheter at skin depth: 10 cm  Catheter securement method: clear occlusive dressing    Test dose: lidocaine 1.5% with epinephrine 1-to-200,000  Test dose given at 10/28/2023 3:03 AM  Test dose: lidocaine 1.5% with epinephrine 1-to-200,000  Test dose result: no positive test dose    PCEA  Medication concentration used: 0.044% Bupivacaine with 1.25 mcg/mL Fentanyl and [id number] Epinephrine  Dose (mL): 10  Lockout (minutes): 15  1-Hour Limit (boluses/hr): 4  Basal Rate: 10        Assessment  Sensory level: T10  Block outcome: pain improved  Number of  attempts: 1  Events: no positive test dose

## 2023-10-28 NOTE — PROGRESS NOTES
Intrapartum Progress Note    Assessment/Plan   Daysi Allen is a 18 y.o.  at 40w5d. MAHNAZ: 10/23/2023, by Ultrasound.      Pitocin restart @ 4mu/min   Active labor    Anticipate    Cat II tracing - close observation    Principal Problem:    Encounter for induction of labor  Active Problems:    40 weeks gestation of pregnancy    Pregnancy Problems (from 03/10/23 to present)       Problem Noted Resolved    40 weeks gestation of pregnancy 10/27/2023 by LANEY Raya-NEVIN No    Priority:  Medium      Encounter for induction of labor 10/21/2023 by LANEY Clark-NEVIN No    Priority:  Medium      Anxiety 10/6/2023 by LANEY Yang-NEVIN No    Priority:  Medium      Overview Signed 10/12/2023  3:20 PM by LANEY Miranda-RANDYM, APRN-CNP     Taking zoloft 100mg                 Subjective   Daysi is experiencing some pain.   Called to bedside from RN to assess ? Fetal heart rate decelerations.  She had turned the Pitocin off.    Objective   Last Vitals:  Temp Pulse Resp BP MAP Pulse Ox   36.3 °C (97.3 °F) 108 16 121/77   100 %     Vitals Min/Max Last 24 Hours:  Temp  Min: 36.3 °C (97.3 °F)  Max: 37.3 °C (99.1 °F)  Pulse  Min: 70  Max: 114  Resp  Min: 16  Max: 20  BP  Min: 106/56  Max: 148/93    Intake/Output:    Intake/Output Summary (Last 24 hours) at 10/28/2023 1803  Last data filed at 10/28/2023 1600  Gross per 24 hour   Intake 2026.25 ml   Output 1000 ml   Net 1026.25 ml       Physical Examination:  FHR is 120 Mod variability , with ? Prolonged decel, and a Category IItracing.    [place] reading: q3/50/+2  Pitocin was @ 10mu/min --> now off   CERVIX:  7/100/+1 ; MEMBRANES are MSF    Lab Review:  Labs in chart were reviewed.

## 2023-10-28 NOTE — ANESTHESIA PREPROCEDURE EVALUATION
Patient: Daysi Allen    Evaluation Method: In-person visit    Procedure Information    Date: 10/28/23  Procedure: Labor Analgesia         Relevant Problems   Anesthesia (within normal limits)      Cardiovascular (within normal limits)      Endocrine (within normal limits)      GI (within normal limits)      /Renal (within normal limits)      Neuro/Psych   (+) Anxiety      Pulmonary (within normal limits)      GI/Hepatic (within normal limits)      Hematology (within normal limits)      Musculoskeletal (within normal limits)      Eyes, Ears, Nose, and Throat (within normal limits)      Infectious Disease (within normal limits)       Clinical information reviewed:    Allergies  Meds  Problems              NPO Detail:  No data recorded     OB/Gyn Evaluation    Present Pregnancy    Patient is pregnant now.   Obstetric History                Physical Exam    Airway  Mallampati: II  TM distance: >3 FB  Neck ROM: full     Cardiovascular   Rhythm: regular  Rate: normal     Dental    Pulmonary - normal exam     Abdominal            Anesthesia Plan    ASA 2     epidural     The patient is not a current smoker.    Anesthetic plan and risks discussed with patient.    Plan discussed with attending.

## 2023-10-28 NOTE — PROGRESS NOTES
Intrapartum Progress Note    Assessment/Plan   Daysi Allen is a 18 y.o.  at 40w5d. MAHNAZ: 10/23/2023, by Ultrasound.      Hold Flexeril   Continue Pitocin   Recheck 4 hours/prn    Principal Problem:    Encounter for induction of labor  Active Problems:    40 weeks gestation of pregnancy    Pregnancy Problems (from 03/10/23 to present)       Problem Noted Resolved    40 weeks gestation of pregnancy 10/27/2023 by LANEY Raya-NEVIN No    Priority:  Medium      Encounter for induction of labor 10/21/2023 by ZEFERINO Clark No    Priority:  Medium      Anxiety 10/6/2023 by ZEFERINO Yang No    Priority:  Medium      Overview Signed 10/12/2023  3:20 PM by ZEFERINO Miranda, APRN-CNP     Taking zoloft 100mg                 Subjective   Daysi is laying on her right side with the peanut ball, reports relief from her neck pain    Objective   Last Vitals:  Temp Pulse Resp BP MAP Pulse Ox   36.6 °C (97.9 °F) 88 18 120/70   98 %     Vitals Min/Max Last 24 Hours:  Temp  Min: 36.5 °C (97.7 °F)  Max: 37.3 °C (99.1 °F)  Pulse  Min: 70  Max: 114  Resp  Min: 16  Max: 20  BP  Min: 106/56  Max: 148/93    Intake/Output:    Intake/Output Summary (Last 24 hours) at 10/28/2023 1557  Last data filed at 10/28/2023 1435  Gross per 24 hour   Intake 2026.25 ml   Output 900 ml   Net 1126.25 ml       Physical Examination:  FHR is 120 , with accelerations, neg decels and a Category I tracing.    [place] difficult to assess secondary to patient position  Cervix 6/90/0  Pitocin @ 4mu/min    Lab Review:  Labs in chart were reviewed.

## 2023-10-28 NOTE — PROGRESS NOTES
Assessment    18 y.o.  at 40w5d  FHT Category 1  Latent labor  Anxiety  Plan    Ativan 1mg IV now     Augmentation with pitocin per protocol - patient agrees    ZEFERINO Hartley    Subjective:  Daysi Allen is very anxious.  She is having pain in between her shoulder blades most likely from her epidural placement.  Tylenol given and recently given Flexeril.    Objective:  Fetal Monitoring      Baseline FHR: 135 per minute  Variability: moderate  Accelerations: yes  Decelerations: none  TOCO: irregular, every 3-7 minutes    Elevated BP mild range x 1    Cervical Exam:  deferred at this time    Membrane status: AROM, MSF        Vitals:    10/28/23 1132 10/28/23 1133 10/28/23 1137 10/28/23 1142   BP:  (!) 148/93     Pulse: 95 84 93 (!) 114   Resp:  20     Temp:  37.1 °C (98.8 °F)     TempSrc:       SpO2: 98% 98% 97% 100%

## 2023-10-29 ENCOUNTER — APPOINTMENT (OUTPATIENT)
Dept: RADIOLOGY | Facility: HOSPITAL | Age: 18
End: 2023-10-29
Payer: COMMERCIAL

## 2023-10-29 LAB
ALBUMIN SERPL BCP-MCNC: 2.6 G/DL (ref 3.4–5)
ALP SERPL-CCNC: 120 U/L (ref 33–110)
ALT SERPL W P-5'-P-CCNC: 10 U/L (ref 7–45)
ANION GAP BLDA CALCULATED.4IONS-SCNC: ABNORMAL MMOL/L
ANION GAP SERPL CALC-SCNC: 11 MMOL/L (ref 10–20)
APTT PPP: 29 SECONDS (ref 27–38)
AST SERPL W P-5'-P-CCNC: 24 U/L (ref 9–39)
BASE EXCESS BLDA CALC-SCNC: -8.3 MMOL/L (ref -2–3)
BASE EXCESS BLDCOV CALC-SCNC: -3.3 MMOL/L (ref -8.1–-0.5)
BILIRUB SERPL-MCNC: 0.9 MG/DL (ref 0–1.2)
BLOOD EXPIRATION DATE: NORMAL
BLOOD EXPIRATION DATE: NORMAL
BODY TEMPERATURE: 37 DEGREES CELSIUS
BODY TEMPERATURE: 37 DEGREES CELSIUS
BUN SERPL-MCNC: 11 MG/DL (ref 6–23)
CA-I BLDA-SCNC: 1.19 MMOL/L (ref 1.1–1.33)
CALCIUM SERPL-MCNC: 8 MG/DL (ref 8.6–10.6)
CHLORIDE BLDA-SCNC: ABNORMAL MMOL/L
CHLORIDE SERPL-SCNC: 109 MMOL/L (ref 98–107)
CO2 SERPL-SCNC: 21 MMOL/L (ref 21–32)
CREAT SERPL-MCNC: 0.73 MG/DL (ref 0.5–1.05)
DISPENSE STATUS: NORMAL
DISPENSE STATUS: NORMAL
ERYTHROCYTE [DISTWIDTH] IN BLOOD BY AUTOMATED COUNT: 13 % (ref 11.5–14.5)
FIBRINOGEN PPP-MCNC: 321 MG/DL (ref 200–400)
GFR SERPL CREATININE-BSD FRML MDRD: >90 ML/MIN/1.73M*2
GLUCOSE BLDA-MCNC: 103 MG/DL (ref 74–99)
GLUCOSE SERPL-MCNC: 76 MG/DL (ref 74–99)
HCO3 BLDA-SCNC: 19.2 MMOL/L (ref 22–26)
HCO3 BLDCOV-SCNC: 22.1 MMOL/L (ref 16–26)
HCT VFR BLD AUTO: 34.1 % (ref 36–46)
HCT VFR BLD EST: 34 % (ref 36–46)
HGB BLD-MCNC: 12 G/DL (ref 12–16)
HGB BLDA-MCNC: 11.3 G/DL (ref 12–16)
INHALED O2 CONCENTRATION: 100 %
INHALED O2 CONCENTRATION: 21 %
INR PPP: 1.1 (ref 0.9–1.1)
LACTATE BLDA-SCNC: 2.5 MMOL/L (ref 0.4–2)
MCH RBC QN AUTO: 33.1 PG (ref 26–34)
MCHC RBC AUTO-ENTMCNC: 35.2 G/DL (ref 32–36)
MCV RBC AUTO: 94 FL (ref 80–100)
NRBC BLD-RTO: 0 /100 WBCS (ref 0–0)
OXYHGB MFR BLDA: 97.1 % (ref 94–98)
OXYHGB MFR BLDCOV: 57.5 % (ref 94–98)
PCO2 BLDA: 47 MM HG (ref 38–42)
PCO2 BLDCOV: 40 MM HG (ref 22–53)
PH BLDA: 7.22 PH (ref 7.38–7.42)
PH BLDCOV: 7.35 PH (ref 7.19–7.47)
PLATELET # BLD AUTO: 180 X10*3/UL (ref 150–450)
PMV BLD AUTO: 11 FL (ref 7.5–11.5)
PO2 BLDA: 116 MM HG (ref 85–95)
PO2 BLDCOV: 35 MM HG (ref 13–37)
POTASSIUM BLDA-SCNC: 4.3 MMOL/L (ref 3.5–5.3)
POTASSIUM SERPL-SCNC: 4.5 MMOL/L (ref 3.5–5.3)
PRODUCT BLOOD TYPE: 5100
PRODUCT BLOOD TYPE: 5100
PRODUCT CODE: NORMAL
PRODUCT CODE: NORMAL
PROT SERPL-MCNC: 4.2 G/DL (ref 6.4–8.2)
PROTHROMBIN TIME: 12.2 SECONDS (ref 9.8–12.8)
RBC # BLD AUTO: 3.62 X10*6/UL (ref 4–5.2)
SAO2 % BLDA: 100 % (ref 94–100)
SAO2 % BLDCOV: 59 % (ref 16–84)
SODIUM BLDA-SCNC: 133 MMOL/L (ref 136–145)
SODIUM SERPL-SCNC: 136 MMOL/L (ref 136–145)
UNIT ABO: NORMAL
UNIT ABO: NORMAL
UNIT NUMBER: NORMAL
UNIT NUMBER: NORMAL
UNIT RH: NORMAL
UNIT RH: NORMAL
UNIT VOLUME: 300
UNIT VOLUME: 350
WBC # BLD AUTO: 24.1 X10*3/UL (ref 4.4–11.3)
XM INTEP: NORMAL
XM INTEP: NORMAL

## 2023-10-29 PROCEDURE — 74018 RADEX ABDOMEN 1 VIEW: CPT | Performed by: RADIOLOGY

## 2023-10-29 PROCEDURE — 04LF0ZU OCCLUSION OF LEFT UTERINE ARTERY, OPEN APPROACH: ICD-10-PCS | Performed by: STUDENT IN AN ORGANIZED HEALTH CARE EDUCATION/TRAINING PROGRAM

## 2023-10-29 PROCEDURE — 2500000004 HC RX 250 GENERAL PHARMACY W/ HCPCS (ALT 636 FOR OP/ED): Performed by: STUDENT IN AN ORGANIZED HEALTH CARE EDUCATION/TRAINING PROGRAM

## 2023-10-29 PROCEDURE — 85384 FIBRINOGEN ACTIVITY: CPT | Performed by: STUDENT IN AN ORGANIZED HEALTH CARE EDUCATION/TRAINING PROGRAM

## 2023-10-29 PROCEDURE — 49000 EXPLORATION OF ABDOMEN: CPT | Performed by: STUDENT IN AN ORGANIZED HEALTH CARE EDUCATION/TRAINING PROGRAM

## 2023-10-29 PROCEDURE — 0TN70ZZ RELEASE LEFT URETER, OPEN APPROACH: ICD-10-PCS | Performed by: STUDENT IN AN ORGANIZED HEALTH CARE EDUCATION/TRAINING PROGRAM

## 2023-10-29 PROCEDURE — 3700000014 HC AN EPIDURAL BLOCK CHARGE: Performed by: OBSTETRICS & GYNECOLOGY

## 2023-10-29 PROCEDURE — 2500000005 HC RX 250 GENERAL PHARMACY W/O HCPCS: Performed by: STUDENT IN AN ORGANIZED HEALTH CARE EDUCATION/TRAINING PROGRAM

## 2023-10-29 PROCEDURE — 82805 BLOOD GASES W/O2 SATURATION: CPT | Performed by: ADVANCED PRACTICE MIDWIFE

## 2023-10-29 PROCEDURE — 88307 TISSUE EXAM BY PATHOLOGIST: CPT | Mod: TC,SUR | Performed by: STUDENT IN AN ORGANIZED HEALTH CARE EDUCATION/TRAINING PROGRAM

## 2023-10-29 PROCEDURE — 85027 COMPLETE CBC AUTOMATED: CPT | Performed by: STUDENT IN AN ORGANIZED HEALTH CARE EDUCATION/TRAINING PROGRAM

## 2023-10-29 PROCEDURE — 3700000018 HC OB ANESTHESIA C-SECTION: Performed by: OBSTETRICS & GYNECOLOGY

## 2023-10-29 PROCEDURE — 59510 CESAREAN DELIVERY: CPT | Performed by: OBSTETRICS & GYNECOLOGY

## 2023-10-29 PROCEDURE — 84295 ASSAY OF SERUM SODIUM: CPT | Performed by: STUDENT IN AN ORGANIZED HEALTH CARE EDUCATION/TRAINING PROGRAM

## 2023-10-29 PROCEDURE — 2500000005 HC RX 250 GENERAL PHARMACY W/O HCPCS: Performed by: ADVANCED PRACTICE MIDWIFE

## 2023-10-29 PROCEDURE — 2500000001 HC RX 250 WO HCPCS SELF ADMINISTERED DRUGS (ALT 637 FOR MEDICARE OP): Performed by: STUDENT IN AN ORGANIZED HEALTH CARE EDUCATION/TRAINING PROGRAM

## 2023-10-29 PROCEDURE — 2500000004 HC RX 250 GENERAL PHARMACY W/ HCPCS (ALT 636 FOR OP/ED): Performed by: ADVANCED PRACTICE MIDWIFE

## 2023-10-29 PROCEDURE — 85610 PROTHROMBIN TIME: CPT | Performed by: STUDENT IN AN ORGANIZED HEALTH CARE EDUCATION/TRAINING PROGRAM

## 2023-10-29 PROCEDURE — 7100000016 HC LABOR RECOVERY PER HOUR: Performed by: OBSTETRICS & GYNECOLOGY

## 2023-10-29 PROCEDURE — 84075 ASSAY ALKALINE PHOSPHATASE: CPT | Performed by: STUDENT IN AN ORGANIZED HEALTH CARE EDUCATION/TRAINING PROGRAM

## 2023-10-29 PROCEDURE — 0T778DZ DILATION OF LEFT URETER WITH INTRALUMINAL DEVICE, VIA NATURAL OR ARTIFICIAL OPENING ENDOSCOPIC: ICD-10-PCS | Performed by: STUDENT IN AN ORGANIZED HEALTH CARE EDUCATION/TRAINING PROGRAM

## 2023-10-29 PROCEDURE — 88307 TISSUE EXAM BY PATHOLOGIST: CPT | Performed by: PATHOLOGY

## 2023-10-29 PROCEDURE — 82330 ASSAY OF CALCIUM: CPT | Performed by: STUDENT IN AN ORGANIZED HEALTH CARE EDUCATION/TRAINING PROGRAM

## 2023-10-29 PROCEDURE — 58660 LAPAROSCOPY LYSIS: CPT | Performed by: STUDENT IN AN ORGANIZED HEALTH CARE EDUCATION/TRAINING PROGRAM

## 2023-10-29 PROCEDURE — 1210000001 HC SEMI-PRIVATE ROOM DAILY

## 2023-10-29 PROCEDURE — 74018 RADEX ABDOMEN 1 VIEW: CPT | Mod: FY

## 2023-10-29 PROCEDURE — P9016 RBC LEUKOCYTES REDUCED: HCPCS

## 2023-10-29 PROCEDURE — 94762 N-INVAS EAR/PLS OXIMTRY CONT: CPT

## 2023-10-29 PROCEDURE — 59514 CESAREAN DELIVERY ONLY: CPT | Performed by: OBSTETRICS & GYNECOLOGY

## 2023-10-29 PROCEDURE — 82435 ASSAY OF BLOOD CHLORIDE: CPT | Performed by: STUDENT IN AN ORGANIZED HEALTH CARE EDUCATION/TRAINING PROGRAM

## 2023-10-29 RX ORDER — ACETAMINOPHEN 120 MG/1
SUPPOSITORY RECTAL AS NEEDED
Status: DISCONTINUED | OUTPATIENT
Start: 2023-10-29 | End: 2023-10-29

## 2023-10-29 RX ORDER — PROPOFOL 10 MG/ML
INJECTION, EMULSION INTRAVENOUS CONTINUOUS PRN
Status: DISCONTINUED | OUTPATIENT
Start: 2023-10-29 | End: 2023-10-29

## 2023-10-29 RX ORDER — NALOXONE HYDROCHLORIDE 0.4 MG/ML
0.2 INJECTION, SOLUTION INTRAMUSCULAR; INTRAVENOUS; SUBCUTANEOUS AS NEEDED
Status: DISCONTINUED | OUTPATIENT
Start: 2023-10-29 | End: 2023-11-01 | Stop reason: HOSPADM

## 2023-10-29 RX ORDER — SODIUM CHLORIDE, SODIUM LACTATE, POTASSIUM CHLORIDE, CALCIUM CHLORIDE 600; 310; 30; 20 MG/100ML; MG/100ML; MG/100ML; MG/100ML
125 INJECTION, SOLUTION INTRAVENOUS CONTINUOUS
Status: DISCONTINUED | OUTPATIENT
Start: 2023-10-29 | End: 2023-11-01 | Stop reason: HOSPADM

## 2023-10-29 RX ORDER — DIPHENHYDRAMINE HYDROCHLORIDE 50 MG/ML
25 INJECTION INTRAMUSCULAR; INTRAVENOUS EVERY 4 HOURS PRN
Status: DISCONTINUED | OUTPATIENT
Start: 2023-10-29 | End: 2023-11-01 | Stop reason: HOSPADM

## 2023-10-29 RX ORDER — HYDRALAZINE HYDROCHLORIDE 20 MG/ML
5 INJECTION INTRAMUSCULAR; INTRAVENOUS ONCE AS NEEDED
Status: DISCONTINUED | OUTPATIENT
Start: 2023-10-29 | End: 2023-11-01 | Stop reason: HOSPADM

## 2023-10-29 RX ORDER — ONDANSETRON HYDROCHLORIDE 2 MG/ML
4 INJECTION, SOLUTION INTRAVENOUS EVERY 6 HOURS PRN
Status: DISCONTINUED | OUTPATIENT
Start: 2023-10-29 | End: 2023-11-01 | Stop reason: HOSPADM

## 2023-10-29 RX ORDER — OXYCODONE HYDROCHLORIDE 5 MG/1
5 TABLET ORAL EVERY 4 HOURS PRN
Status: DISCONTINUED | OUTPATIENT
Start: 2023-10-29 | End: 2023-11-01 | Stop reason: HOSPADM

## 2023-10-29 RX ORDER — PROMETHAZINE HYDROCHLORIDE 25 MG/1
25 TABLET ORAL EVERY 6 HOURS PRN
Status: DISCONTINUED | OUTPATIENT
Start: 2023-10-29 | End: 2023-11-01 | Stop reason: HOSPADM

## 2023-10-29 RX ORDER — OXYCODONE HYDROCHLORIDE 5 MG/1
10 TABLET ORAL EVERY 4 HOURS PRN
Status: DISCONTINUED | OUTPATIENT
Start: 2023-10-29 | End: 2023-10-31 | Stop reason: SDUPTHER

## 2023-10-29 RX ORDER — TRANEXAMIC ACID 100 MG/ML
1000 INJECTION, SOLUTION INTRAVENOUS ONCE AS NEEDED
Status: DISCONTINUED | OUTPATIENT
Start: 2023-10-29 | End: 2023-11-01 | Stop reason: HOSPADM

## 2023-10-29 RX ORDER — SIMETHICONE 80 MG
80 TABLET,CHEWABLE ORAL 4 TIMES DAILY PRN
Status: DISCONTINUED | OUTPATIENT
Start: 2023-10-29 | End: 2023-11-01 | Stop reason: HOSPADM

## 2023-10-29 RX ORDER — ADHESIVE BANDAGE
10 BANDAGE TOPICAL
Status: DISCONTINUED | OUTPATIENT
Start: 2023-10-29 | End: 2023-11-01 | Stop reason: HOSPADM

## 2023-10-29 RX ORDER — METHYLERGONOVINE MALEATE 0.2 MG/ML
0.2 INJECTION INTRAVENOUS ONCE AS NEEDED
Status: DISCONTINUED | OUTPATIENT
Start: 2023-10-29 | End: 2023-11-01 | Stop reason: HOSPADM

## 2023-10-29 RX ORDER — LOPERAMIDE HYDROCHLORIDE 2 MG/1
4 CAPSULE ORAL EVERY 2 HOUR PRN
Status: DISCONTINUED | OUTPATIENT
Start: 2023-10-29 | End: 2023-11-01 | Stop reason: HOSPADM

## 2023-10-29 RX ORDER — PROPOFOL 10 MG/ML
INJECTION, EMULSION INTRAVENOUS AS NEEDED
Status: DISCONTINUED | OUTPATIENT
Start: 2023-10-29 | End: 2023-10-29

## 2023-10-29 RX ORDER — FENTANYL/BUPIVACAINE/NS/PF 2MCG/ML-.1
PLASTIC BAG, INJECTION (ML) INJECTION AS NEEDED
Status: DISCONTINUED | OUTPATIENT
Start: 2023-10-29 | End: 2023-10-29

## 2023-10-29 RX ORDER — KETOROLAC TROMETHAMINE 30 MG/ML
30 INJECTION, SOLUTION INTRAMUSCULAR; INTRAVENOUS EVERY 6 HOURS
Status: ACTIVE | OUTPATIENT
Start: 2023-10-29 | End: 2023-10-30

## 2023-10-29 RX ORDER — PROCHLORPERAZINE EDISYLATE 5 MG/ML
10 INJECTION INTRAMUSCULAR; INTRAVENOUS EVERY 6 HOURS PRN
Status: DISCONTINUED | OUTPATIENT
Start: 2023-10-29 | End: 2023-11-01 | Stop reason: HOSPADM

## 2023-10-29 RX ORDER — HYDROMORPHONE HYDROCHLORIDE 1 MG/ML
INJECTION, SOLUTION INTRAMUSCULAR; INTRAVENOUS; SUBCUTANEOUS AS NEEDED
Status: DISCONTINUED | OUTPATIENT
Start: 2023-10-29 | End: 2023-10-29

## 2023-10-29 RX ORDER — OXYCODONE HYDROCHLORIDE 5 MG/1
5 TABLET ORAL EVERY 4 HOURS PRN
Status: DISCONTINUED | OUTPATIENT
Start: 2023-10-29 | End: 2023-10-31 | Stop reason: SDUPTHER

## 2023-10-29 RX ORDER — IBUPROFEN 600 MG/1
600 TABLET ORAL EVERY 6 HOURS
Status: DISCONTINUED | OUTPATIENT
Start: 2023-10-30 | End: 2023-11-01 | Stop reason: HOSPADM

## 2023-10-29 RX ORDER — ROCURONIUM BROMIDE 10 MG/ML
INJECTION, SOLUTION INTRAVENOUS AS NEEDED
Status: DISCONTINUED | OUTPATIENT
Start: 2023-10-29 | End: 2023-10-29

## 2023-10-29 RX ORDER — BISACODYL 10 MG/1
10 SUPPOSITORY RECTAL DAILY PRN
Status: DISCONTINUED | OUTPATIENT
Start: 2023-10-29 | End: 2023-11-01 | Stop reason: HOSPADM

## 2023-10-29 RX ORDER — MIDAZOLAM HYDROCHLORIDE 1 MG/ML
INJECTION, SOLUTION INTRAMUSCULAR; INTRAVENOUS AS NEEDED
Status: DISCONTINUED | OUTPATIENT
Start: 2023-10-29 | End: 2023-10-29

## 2023-10-29 RX ORDER — NIFEDIPINE 10 MG/1
10 CAPSULE ORAL ONCE AS NEEDED
Status: DISCONTINUED | OUTPATIENT
Start: 2023-10-29 | End: 2023-11-01 | Stop reason: HOSPADM

## 2023-10-29 RX ORDER — LABETALOL HYDROCHLORIDE 5 MG/ML
20 INJECTION, SOLUTION INTRAVENOUS ONCE AS NEEDED
Status: DISCONTINUED | OUTPATIENT
Start: 2023-10-29 | End: 2023-11-01 | Stop reason: HOSPADM

## 2023-10-29 RX ORDER — ENOXAPARIN SODIUM 100 MG/ML
40 INJECTION SUBCUTANEOUS EVERY 24 HOURS
Status: DISCONTINUED | OUTPATIENT
Start: 2023-10-30 | End: 2023-11-01 | Stop reason: HOSPADM

## 2023-10-29 RX ORDER — ACETAMINOPHEN 325 MG/1
975 TABLET ORAL EVERY 6 HOURS
Status: DISCONTINUED | OUTPATIENT
Start: 2023-10-29 | End: 2023-11-01 | Stop reason: HOSPADM

## 2023-10-29 RX ORDER — HYDROMORPHONE HCL/0.9% NACL/PF 15 MG/30ML
PATIENT CONTROLLED ANALGESIA SYRINGE INTRAVENOUS CONTINUOUS
Status: DISCONTINUED | OUTPATIENT
Start: 2023-10-29 | End: 2023-10-31

## 2023-10-29 RX ORDER — PROCHLORPERAZINE 25 MG/1
25 SUPPOSITORY RECTAL EVERY 12 HOURS PRN
Status: DISCONTINUED | OUTPATIENT
Start: 2023-10-29 | End: 2023-11-01 | Stop reason: HOSPADM

## 2023-10-29 RX ORDER — MISOPROSTOL 200 UG/1
800 TABLET ORAL ONCE AS NEEDED
Status: DISCONTINUED | OUTPATIENT
Start: 2023-10-29 | End: 2023-11-01 | Stop reason: HOSPADM

## 2023-10-29 RX ORDER — FAMOTIDINE 10 MG/ML
INJECTION INTRAVENOUS AS NEEDED
Status: DISCONTINUED | OUTPATIENT
Start: 2023-10-29 | End: 2023-10-29

## 2023-10-29 RX ORDER — NALBUPHINE HYDROCHLORIDE 10 MG/ML
5 INJECTION, SOLUTION INTRAMUSCULAR; INTRAVENOUS; SUBCUTANEOUS
Status: ACTIVE | OUTPATIENT
Start: 2023-10-29 | End: 2023-10-30

## 2023-10-29 RX ORDER — MORPHINE SULFATE 0.5 MG/ML
INJECTION, SOLUTION EPIDURAL; INTRATHECAL; INTRAVENOUS AS NEEDED
Status: DISCONTINUED | OUTPATIENT
Start: 2023-10-29 | End: 2023-10-29

## 2023-10-29 RX ORDER — OXYTOCIN 10 [USP'U]/ML
10 INJECTION, SOLUTION INTRAMUSCULAR; INTRAVENOUS ONCE AS NEEDED
Status: DISCONTINUED | OUTPATIENT
Start: 2023-10-29 | End: 2023-11-01 | Stop reason: HOSPADM

## 2023-10-29 RX ORDER — CHLOROPROCAINE HYDROCHLORIDE 30 MG/ML
INJECTION, SOLUTION EPIDURAL; INFILTRATION; INTRACAUDAL; PERINEURAL AS NEEDED
Status: DISCONTINUED | OUTPATIENT
Start: 2023-10-29 | End: 2023-10-29

## 2023-10-29 RX ORDER — CARBOPROST TROMETHAMINE 250 UG/ML
250 INJECTION, SOLUTION INTRAMUSCULAR ONCE AS NEEDED
Status: DISCONTINUED | OUTPATIENT
Start: 2023-10-29 | End: 2023-11-01 | Stop reason: HOSPADM

## 2023-10-29 RX ORDER — LIDOCAINE 560 MG/1
1 PATCH PERCUTANEOUS; TOPICAL; TRANSDERMAL
Status: DISCONTINUED | OUTPATIENT
Start: 2023-10-29 | End: 2023-11-01 | Stop reason: HOSPADM

## 2023-10-29 RX ORDER — NALOXONE HYDROCHLORIDE 0.4 MG/ML
0.1 INJECTION, SOLUTION INTRAMUSCULAR; INTRAVENOUS; SUBCUTANEOUS EVERY 5 MIN PRN
Status: DISCONTINUED | OUTPATIENT
Start: 2023-10-29 | End: 2023-11-01 | Stop reason: HOSPADM

## 2023-10-29 RX ORDER — ONDANSETRON 4 MG/1
4 TABLET, FILM COATED ORAL EVERY 6 HOURS PRN
Status: DISCONTINUED | OUTPATIENT
Start: 2023-10-29 | End: 2023-11-01 | Stop reason: HOSPADM

## 2023-10-29 RX ORDER — CEFAZOLIN 1 G/1
INJECTION, POWDER, FOR SOLUTION INTRAVENOUS AS NEEDED
Status: DISCONTINUED | OUTPATIENT
Start: 2023-10-29 | End: 2023-10-29

## 2023-10-29 RX ORDER — KETOROLAC TROMETHAMINE 30 MG/ML
30 INJECTION, SOLUTION INTRAMUSCULAR; INTRAVENOUS EVERY 6 HOURS
Status: COMPLETED | OUTPATIENT
Start: 2023-10-29 | End: 2023-10-30

## 2023-10-29 RX ORDER — DIPHENHYDRAMINE HCL 25 MG
25 CAPSULE ORAL EVERY 4 HOURS PRN
Status: DISCONTINUED | OUTPATIENT
Start: 2023-10-29 | End: 2023-11-01 | Stop reason: HOSPADM

## 2023-10-29 RX ORDER — POLYETHYLENE GLYCOL 3350 17 G/17G
17 POWDER, FOR SOLUTION ORAL 2 TIMES DAILY PRN
Status: DISCONTINUED | OUTPATIENT
Start: 2023-10-29 | End: 2023-11-01 | Stop reason: HOSPADM

## 2023-10-29 RX ORDER — NALOXONE HYDROCHLORIDE 0.4 MG/ML
0.1 INJECTION, SOLUTION INTRAMUSCULAR; INTRAVENOUS; SUBCUTANEOUS EVERY 5 MIN PRN
Status: DISCONTINUED | OUTPATIENT
Start: 2023-10-29 | End: 2023-10-29

## 2023-10-29 RX ORDER — OXYCODONE HYDROCHLORIDE 5 MG/1
10 TABLET ORAL EVERY 4 HOURS PRN
Status: DISCONTINUED | OUTPATIENT
Start: 2023-10-29 | End: 2023-11-01 | Stop reason: HOSPADM

## 2023-10-29 RX ORDER — PROCHLORPERAZINE MALEATE 10 MG
10 TABLET ORAL EVERY 6 HOURS PRN
Status: DISCONTINUED | OUTPATIENT
Start: 2023-10-29 | End: 2023-11-01 | Stop reason: HOSPADM

## 2023-10-29 RX ORDER — PROMETHAZINE HYDROCHLORIDE 25 MG/1
25 SUPPOSITORY RECTAL EVERY 12 HOURS PRN
Status: DISCONTINUED | OUTPATIENT
Start: 2023-10-29 | End: 2023-11-01 | Stop reason: HOSPADM

## 2023-10-29 RX ORDER — HYDROMORPHONE HYDROCHLORIDE 1 MG/ML
0.2 INJECTION, SOLUTION INTRAMUSCULAR; INTRAVENOUS; SUBCUTANEOUS EVERY 5 MIN PRN
Status: DISCONTINUED | OUTPATIENT
Start: 2023-10-29 | End: 2023-11-01 | Stop reason: HOSPADM

## 2023-10-29 RX ORDER — PHENYLEPHRINE 10 MG/250 ML(40 MCG/ML)IN 0.9 % SOD.CHLORIDE INTRAVENOUS
CONTINUOUS PRN
Status: DISCONTINUED | OUTPATIENT
Start: 2023-10-29 | End: 2023-10-29

## 2023-10-29 RX ORDER — HYDROMORPHONE HYDROCHLORIDE 1 MG/ML
0.2 INJECTION, SOLUTION INTRAMUSCULAR; INTRAVENOUS; SUBCUTANEOUS EVERY 5 MIN PRN
Status: COMPLETED | OUTPATIENT
Start: 2023-10-29 | End: 2023-10-29

## 2023-10-29 RX ORDER — OXYTOCIN/0.9 % SODIUM CHLORIDE 30/500 ML
60 PLASTIC BAG, INJECTION (ML) INTRAVENOUS ONCE AS NEEDED
Status: DISCONTINUED | OUTPATIENT
Start: 2023-10-29 | End: 2023-11-01 | Stop reason: HOSPADM

## 2023-10-29 RX ADMIN — LIDOCAINE HYDROCHLORIDE,EPINEPHRINE BITARTRATE 5 ML: 20; .005 INJECTION, SOLUTION EPIDURAL; INFILTRATION; INTRACAUDAL; PERINEURAL at 02:00

## 2023-10-29 RX ADMIN — PROPOFOL 20 MG: 10 INJECTION, EMULSION INTRAVENOUS at 03:32

## 2023-10-29 RX ADMIN — SODIUM CHLORIDE, POTASSIUM CHLORIDE, SODIUM LACTATE AND CALCIUM CHLORIDE: 600; 310; 30; 20 INJECTION, SOLUTION INTRAVENOUS at 01:41

## 2023-10-29 RX ADMIN — SODIUM CHLORIDE, SODIUM LACTATE, POTASSIUM CHLORIDE, AND CALCIUM CHLORIDE 500 ML: 600; 310; 30; 20 INJECTION, SOLUTION INTRAVENOUS at 23:30

## 2023-10-29 RX ADMIN — HYDROMORPHONE HYDROCHLORIDE 0.2 MG: 1 INJECTION, SOLUTION INTRAMUSCULAR; INTRAVENOUS; SUBCUTANEOUS at 07:18

## 2023-10-29 RX ADMIN — HYDROMORPHONE HYDROCHLORIDE 0.4 MG: 1 INJECTION, SOLUTION INTRAMUSCULAR; INTRAVENOUS; SUBCUTANEOUS at 06:23

## 2023-10-29 RX ADMIN — PROPOFOL 150 MG: 10 INJECTION, EMULSION INTRAVENOUS at 04:19

## 2023-10-29 RX ADMIN — ACETAMINOPHEN 975 MG: 325 TABLET ORAL at 19:16

## 2023-10-29 RX ADMIN — MIDAZOLAM 2 MG: 1 INJECTION INTRAMUSCULAR; INTRAVENOUS at 02:20

## 2023-10-29 RX ADMIN — HYDROMORPHONE HYDROCHLORIDE 0.4 MG: 1 INJECTION, SOLUTION INTRAMUSCULAR; INTRAVENOUS; SUBCUTANEOUS at 05:41

## 2023-10-29 RX ADMIN — HYDROMORPHONE HYDROCHLORIDE 0.2 MG: 1 INJECTION, SOLUTION INTRAMUSCULAR; INTRAVENOUS; SUBCUTANEOUS at 07:51

## 2023-10-29 RX ADMIN — Medication 0.19 MCG/KG/MIN: at 05:11

## 2023-10-29 RX ADMIN — PROPOFOL 20 MG: 10 INJECTION, EMULSION INTRAVENOUS at 02:47

## 2023-10-29 RX ADMIN — HYDROMORPHONE HYDROCHLORIDE 0.2 MG: 1 INJECTION, SOLUTION INTRAMUSCULAR; INTRAVENOUS; SUBCUTANEOUS at 09:05

## 2023-10-29 RX ADMIN — MIDAZOLAM 1 MG: 1 INJECTION INTRAMUSCULAR; INTRAVENOUS at 03:16

## 2023-10-29 RX ADMIN — KETOROLAC TROMETHAMINE 30 MG: 30 INJECTION, SOLUTION INTRAMUSCULAR; INTRAVENOUS at 19:16

## 2023-10-29 RX ADMIN — PROPOFOL 20 MG: 10 INJECTION, EMULSION INTRAVENOUS at 02:59

## 2023-10-29 RX ADMIN — Medication: at 16:50

## 2023-10-29 RX ADMIN — HYDROMORPHONE HYDROCHLORIDE 0.2 MG: 1 INJECTION, SOLUTION INTRAMUSCULAR; INTRAVENOUS; SUBCUTANEOUS at 06:36

## 2023-10-29 RX ADMIN — ACETAMINOPHEN 975 MG: 325 TABLET ORAL at 13:01

## 2023-10-29 RX ADMIN — ACETAMINOPHEN 650 MG: 120 SUPPOSITORY RECTAL at 05:50

## 2023-10-29 RX ADMIN — SODIUM CHLORIDE, POTASSIUM CHLORIDE, SODIUM LACTATE AND CALCIUM CHLORIDE 125 ML/HR: 600; 310; 30; 20 INJECTION, SOLUTION INTRAVENOUS at 06:07

## 2023-10-29 RX ADMIN — HYDROMORPHONE HYDROCHLORIDE 0.4 MG: 1 INJECTION, SOLUTION INTRAMUSCULAR; INTRAVENOUS; SUBCUTANEOUS at 04:57

## 2023-10-29 RX ADMIN — HYDROMORPHONE HYDROCHLORIDE 0.2 MG: 1 INJECTION, SOLUTION INTRAMUSCULAR; INTRAVENOUS; SUBCUTANEOUS at 06:34

## 2023-10-29 RX ADMIN — MIDAZOLAM 1 MG: 1 INJECTION INTRAMUSCULAR; INTRAVENOUS at 03:20

## 2023-10-29 RX ADMIN — ONDANSETRON 4 MG: 2 INJECTION INTRAMUSCULAR; INTRAVENOUS at 01:48

## 2023-10-29 RX ADMIN — LIDOCAINE HYDROCHLORIDE,EPINEPHRINE BITARTRATE 5 ML: 20; .005 INJECTION, SOLUTION EPIDURAL; INFILTRATION; INTRACAUDAL; PERINEURAL at 01:43

## 2023-10-29 RX ADMIN — OXYTOCIN 600 MILLI-UNITS/MIN: 10 INJECTION, SOLUTION INTRAMUSCULAR; INTRAVENOUS at 02:18

## 2023-10-29 RX ADMIN — PROPOFOL 20 MG: 10 INJECTION, EMULSION INTRAVENOUS at 03:42

## 2023-10-29 RX ADMIN — PROPOFOL 30 MG: 10 INJECTION, EMULSION INTRAVENOUS at 02:56

## 2023-10-29 RX ADMIN — ROCURONIUM BROMIDE 100 MG: 10 INJECTION, SOLUTION INTRAVENOUS at 04:19

## 2023-10-29 RX ADMIN — PROPOFOL 50 MCG/KG/MIN: 10 INJECTION, EMULSION INTRAVENOUS at 03:02

## 2023-10-29 RX ADMIN — LIDOCAINE HYDROCHLORIDE,EPINEPHRINE BITARTRATE 5 ML: 20; .005 INJECTION, SOLUTION EPIDURAL; INFILTRATION; INTRACAUDAL; PERINEURAL at 02:35

## 2023-10-29 RX ADMIN — TRANEXAMIC ACID 1000 MG: 100 INJECTION, SOLUTION INTRAVENOUS at 02:25

## 2023-10-29 RX ADMIN — SERTRALINE 100 MG: 100 TABLET, FILM COATED ORAL at 13:59

## 2023-10-29 RX ADMIN — PROPOFOL 20 MG: 10 INJECTION, EMULSION INTRAVENOUS at 02:50

## 2023-10-29 RX ADMIN — CHLOROPROCAINE HYDROCHLORIDE 5 ML: 30 INJECTION, SOLUTION EPIDURAL; INFILTRATION; INTRACAUDAL; PERINEURAL at 02:04

## 2023-10-29 RX ADMIN — PROPOFOL 20 MG: 10 INJECTION, EMULSION INTRAVENOUS at 03:07

## 2023-10-29 RX ADMIN — SODIUM CHLORIDE, POTASSIUM CHLORIDE, SODIUM LACTATE AND CALCIUM CHLORIDE 125 ML/HR: 600; 310; 30; 20 INJECTION, SOLUTION INTRAVENOUS at 13:56

## 2023-10-29 RX ADMIN — SUGAMMADEX 200 MG: 100 INJECTION, SOLUTION INTRAVENOUS at 05:36

## 2023-10-29 RX ADMIN — PROPOFOL 30 MG: 10 INJECTION, EMULSION INTRAVENOUS at 03:02

## 2023-10-29 RX ADMIN — CEFAZOLIN 2 G: 1 INJECTION, POWDER, FOR SOLUTION INTRAMUSCULAR; INTRAVENOUS at 01:49

## 2023-10-29 RX ADMIN — LIDOCAINE HYDROCHLORIDE,EPINEPHRINE BITARTRATE 5 ML: 20; .005 INJECTION, SOLUTION EPIDURAL; INFILTRATION; INTRACAUDAL; PERINEURAL at 01:57

## 2023-10-29 RX ADMIN — LIDOCAINE HYDROCHLORIDE,EPINEPHRINE BITARTRATE 5 ML: 20; .005 INJECTION, SOLUTION EPIDURAL; INFILTRATION; INTRACAUDAL; PERINEURAL at 01:46

## 2023-10-29 RX ADMIN — KETOROLAC TROMETHAMINE 30 MG: 30 INJECTION, SOLUTION INTRAMUSCULAR; INTRAVENOUS at 13:03

## 2023-10-29 RX ADMIN — AZITHROMYCIN 500 MG: 500 INJECTION, POWDER, LYOPHILIZED, FOR SOLUTION INTRAVENOUS at 01:51

## 2023-10-29 RX ADMIN — HYDROMORPHONE HYDROCHLORIDE 0.2 MG: 1 INJECTION, SOLUTION INTRAMUSCULAR; INTRAVENOUS; SUBCUTANEOUS at 10:49

## 2023-10-29 RX ADMIN — Medication 1 L/MIN: at 07:53

## 2023-10-29 RX ADMIN — MORPHINE SULFATE 1 MG: 0.5 INJECTION EPIDURAL; INTRATHECAL; INTRAVENOUS at 03:58

## 2023-10-29 RX ADMIN — FAMOTIDINE 20 MG: 10 INJECTION, SOLUTION INTRAVENOUS at 01:49

## 2023-10-29 RX ADMIN — CHLOROPROCAINE HYDROCHLORIDE 5 ML: 30 INJECTION, SOLUTION EPIDURAL; INFILTRATION; INTRACAUDAL; PERINEURAL at 02:12

## 2023-10-29 RX ADMIN — MORPHINE SULFATE 3 MG: 0.5 INJECTION EPIDURAL; INTRATHECAL; INTRAVENOUS at 02:46

## 2023-10-29 RX ADMIN — Medication 100 ML: at 01:55

## 2023-10-29 RX ADMIN — HYDROMORPHONE HYDROCHLORIDE 0.4 MG: 1 INJECTION, SOLUTION INTRAMUSCULAR; INTRAVENOUS; SUBCUTANEOUS at 06:40

## 2023-10-29 RX ADMIN — CEFAZOLIN 2 G: 1 INJECTION, POWDER, FOR SOLUTION INTRAMUSCULAR; INTRAVENOUS at 02:44

## 2023-10-29 ASSESSMENT — PAIN SCALES - GENERAL
PAINLEVEL_OUTOF10: 7
PAINLEVEL_OUTOF10: 0 - NO PAIN
PAINLEVEL_OUTOF10: 10 - WORST POSSIBLE PAIN
PAINLEVEL_OUTOF10: 7
PAIN_LEVEL: 2
PAINLEVEL_OUTOF10: 7
PAINLEVEL_OUTOF10: 7
PAINLEVEL_OUTOF10: 4
PAINLEVEL_OUTOF10: 10 - WORST POSSIBLE PAIN
PAINLEVEL_OUTOF10: 7
PAINLEVEL_OUTOF10: 4
PAINLEVEL_OUTOF10: 6
PAINLEVEL_OUTOF10: 7
PAINLEVEL_OUTOF10: 7

## 2023-10-29 ASSESSMENT — PAIN DESCRIPTION - DESCRIPTORS
DESCRIPTORS: DISCOMFORT;ACHING;SORE
DESCRIPTORS: DISCOMFORT;ACHING
DESCRIPTORS: CRAMPING;DISCOMFORT
DESCRIPTORS: ACHING;DISCOMFORT;SORE
DESCRIPTORS: ACHING;DISCOMFORT;SORE
DESCRIPTORS: ACHING;SORE
DESCRIPTORS: SORE;BURNING;CRAMPING
DESCRIPTORS: DISCOMFORT;ACHING;SORE
DESCRIPTORS: DISCOMFORT
DESCRIPTORS: ACHING;DISCOMFORT

## 2023-10-29 NOTE — OP NOTE
OBGYN Delivery  Section, Cystoscopy with Insertion Stent Ureter (L), Lysis Adhesions Ureter (L) Operative Note     Date: 10/27/2023 - 10/29/2023  OR Location: [facility]    Name: Daysi Allen, : 2005, Age: 18 y.o., MRN: [id number], Sex: female    Diagnosis  Left ureteral injury same     Procedures  Cystourethroscopy with insertion of left ureteral stent      Surgeons   Dr Marcio Hilario    Resident/Fellow/Other Assistant:  Surgeon(s) and Role:     * Brina Stearns MD - Assisting     * Yolie Barnes MD - Assisting     * Kyle Reyna MD - Assisting     * Marlin White MD - Assisting     * Ekta Garcia MD - Resident - Assisting    Procedure Summary  Anesthesia: General  ASA: II  Anesthesia Staff: Anesthesiologist: James Stanford MD; Farhana Durant DO; Akosua Aguilar MD  Anesthesia Resident: Bakari Zelaya MD; Polly Angel MD; Nevin Dale MD; Danica Hughes MD  Estimated Blood Loss: 0 mL for urology portion of procedure             Anesthesia Record               Intraprocedure I/O Totals          Intake    Transfuse .00 mL    LR 2400.00 mL    Propofol Drip 0.00 mL    The total shown is the total volume documented since Anesthesia Start was filed.    Oxytocin Drip 63.57 mL    The total shown is the total volume documented since Anesthesia Start was filed.    fentaNYL (PF)-BUPivacaine - Epi 1.25 mcg/mL- 0.044 % 330.00 mL    Total Intake 3493.57 mL       Output    Urine 360 mL    Total Blood Loss - Surgical Delivery (mL) 1500 mL    Total Output 1860 mL       Net    Net Volume 1633.57 mL       Other (could not be determined as input or output)    Surgical Delivery Estimated Blood Loss (mL) 1500          Specimen: No specimens collected     Staff:   Jaylin Scrub: Estevan Vick  Scrub Person: Janeth Valenzuela         Drains and/or Catheters:   Urethral Catheter (Active)     Indications: Daysi Allen is an 18 y.o. female who is having surgery for  Arrest of Descent. Urology was consulted intraoperatively for concern for left ureteral injury during uterine artery ligation. GYNON was able to perform ureterolysis prior to urology arrival and stitch was removed. Given the emergent intraoperative nature of the consultation, patient was under GETA at time of consult and we were unable to obtain informed consent from her. Verbal consent was received from significant other.    A timeout was performed, patient and allergies were confirmed, and antibiotics were confirmed as up to date. Flexible cystoscope was used to perform cystourethroscopy. The bladder had mild contusion on the left trigone and side wall. The ureteral orifices were visible bilaterally with no efflux from the left. On the left we could see spasm/peristalsis with no efflux. A sensor wire was placed through the UO to the level of the kidney as confirmed by palpation of the ureter by GYNONC. The Scope was removed, leaving the wire. We replaced the scope adjacent to the wire and placed a 6x26 JJ stent to the level of the kidney, as confirmed by direct palpation. We then removed the wire and visualized the JJ stent curl in the bladder. This concluded the procedure.     Family was updated by urology team about our portion of hte procedure.    Procedure Details: left stent 6x26 JJ  Complications:  None; patient tolerated the procedure well.    Disposition:  OR for remainder of procedure  Condition: stable

## 2023-10-29 NOTE — PROGRESS NOTES
Dr. Garcia and Dr. Vasquez in room to assess fetal descent with pushing at 3hr shima at 0100.  Fetal head remained at +1-+2 station with good maternal pushing efforts.  After assessment, Dr. Garcia counseled patient on a  as the safest mode of delivery for arrest of descent.  Patient and FOB consented and agreed to plan.  See delivery note for more details.    Damaris Khan, LANEY-NEVIN

## 2023-10-29 NOTE — ANESTHESIA POSTPROCEDURE EVALUATION
Patient: Daysi Allen    Procedure Summary       Date: 10/28/23 Room / Location: [facility]    Anesthesia Start: 255 Anesthesia Stop:     Procedures:       OBGYN Delivery  Section      Cystoscopy with Insertion Stent Ureter (Left: Ureter)      Lysis Adhesions Ureter (Left: Ureter) Diagnosis: (Arrest of Descent)    Surgeons: Filemon Vasquez MD Responsible Provider: Farhana Durant DO    Anesthesia Type: epidural ASA Status: 2            Anesthesia Type: epidural    Vitals Value Taken Time   /81 10/29/23 0624   Temp 36.5 10/29/23 0624   Pulse 102 10/29/23 0621   Resp 18 10/29/23 0624   SpO2 94 % 10/29/23 0621       Anesthesia Post Evaluation    Patient location during evaluation: floor  Patient participation: complete - patient participated  Level of consciousness: sleepy but conscious  Pain score: 2  Pain management: adequate  Airway patency: patent  Cardiovascular status: acceptable and hemodynamically stable  Respiratory status: acceptable and room air  Hydration status: acceptable        No notable events documented.

## 2023-10-29 NOTE — PROGRESS NOTES
Assessment    18 y.o.  at 40w5d  FHT Category 1  Active labor  GBS negative    Plan    Titrate pitocin per unit protcol  Encourage frequent position changes as tolerated  Maternal repositioning  Start/Continue pitocin per protocol  Pain management per patient request  Continue assessment of maternal and fetal wellbeing  Recheck as clinically indicated by maternal or fetal status  Anticipate second stage of labor  Anticipate NSVB    Damaris Khan, APRN-RANDYM    Subjective:  Daysi Allen is sitting in semi fowlers and comfortable with epidural.    Objective:  Fetal Monitoring      Baseline FHR: 145 per minute  Variability: moderate  Accelerations: yes  Decelerations: none  TOCO: regular, every 2-3 minutes    Cervical Exam:  9.5 cm dilated, 100 effaced, 1 station    Membrane status: ruptured, clear fluid    Pitocin is at 4 mu/min.    Vitals:    10/28/23 2041 10/28/23 2042 10/28/23 2047 10/28/23 2052   BP: 139/88      Pulse: (!) 114 (!) 123 (!) 116 (!) 122   Resp:       Temp:       TempSrc:       SpO2:  98% 98% 98%

## 2023-10-29 NOTE — ANESTHESIA PROCEDURE NOTES
Airway  Date/Time: 10/29/2023 4:19 AM  Urgency: elective      Staffing  Performed: resident   Authorized by: Farhana Durant DO    Performed by: Bakari Zelaya MD  Patient location during procedure: OR    Indications and Patient Condition  Indications for airway management: anesthesia  Spontaneous ventilation: present  Sedation level: deep  Preoxygenated: yes  Patient position: sniffing  Mask difficulty assessment: 1 - vent by mask    Final Airway Details  Final airway type: endotracheal airway      Successful airway: ETT  Cuffed: yes   Successful intubation technique: video laryngoscopy  Facilitating devices/methods: intubating stylet  Endotracheal tube insertion site: oral  Blade size: #3  ETT size (mm): 7.0  Cormack-Lehane Classification: grade I - full view of glottis  Placement verified by: chest auscultation and capnometry   Measured from: teeth  ETT to teeth (cm): 19  Number of attempts at approach: 1

## 2023-10-29 NOTE — CARE PLAN
"The patient's goals for the shift include \"To heal safely and recovery to meet her baby in the NICU.\"    The clinical goals for the shift include maintain acceptable level of pain management for patient       Patient progressing towards goals and is more awake and alert and anxious and looking forward to being reunited with baby. Oxygen therapy maintained during sleep due to decrease in oxygen saturation while asleep. Pain management interventions implemented. Dilaudid PCA order in place for when patient request.  Post transfusion labs within normal range. Patient remains tachycardic and PO fluids encouraged per MD team.patient transferred to MAC 4 at end of shift and currently stable.plan for patient to visit baby in nicu.   "

## 2023-10-29 NOTE — PROGRESS NOTES
Assessment    18 y.o.  at 40w5d  FHT Category 1  Second stage of labor  GBS negative    Plan    Pushing well with maternal efforts  Encourage frequent position changes as tolerated  Augmentation with pitocin per protocol  Pain management per patient request  Continue assessment of maternal and fetal wellbeing  Recheck as clinically indicated by maternal or fetal status  Anticipate NSVB    Damaris Khan, ZEFERINO    Subjective:  Daysi Allen is actively pushing in right lateral peanut ball lower outlet.  Is currently pushing well.    Objective:  Fetal Monitoring      Baseline FHR: 150 per minute  Variability: moderate  Accelerations: yes  Decelerations: variable and pushing  TOCO: regular, every 2-3 minutes    Cervical Exam:  10 cm dilated, 100 effaced, 1 station    Membrane status: ruptured, meconium      Pitocin is at 6 mu/min.    Vitals:    10/28/23 2252 10/28/23 2257 10/28/23 2302 10/28/23 2307   BP:       Pulse: (!) 113 (!) 120 (!) 117 (!) 119   Resp:       Temp:       TempSrc:       SpO2: 97% 99% 99% 100%

## 2023-10-29 NOTE — L&D DELIVERY NOTE
OB Delivery Note  10/29/2023  Daysi Batestahir Allen  18 y.o.   , Low Transverse        Gestational Age: 40w6d  /Para:   Quantitative Blood Loss: Admission to Discharge: 2890 mL (10/27/2023  6:46 PM - 10/29/2023  6:30 AM)    Daniela Allen [[id number]]      Labor Events    Rupture date/time: 10/28/2023 1000  Rupture type: Artificial  Fluid color: Meconium  Fluid odor: None  Complications: Failure to Progress in Second Stage, Hemorrhage       Labor Event Times    Dilation complete date/time: 10/28/2023 2206  Start pushing date/time: 10/28/2023 2210       Placenta    Placenta delivery date/time: 10/29/2023 0219  Placenta removal: Manual removal  Placenta appearance: Intact  Placenta disposition: pathology       Cord    Vessels: 3 vessels  Complications: None  Delayed cord clamping?: No  Cord clamped date/time: 10/29/2023 0218  Cord blood disposition: Lab  Gases sent?: Yes  Stem cell collection (by provider): No       Lacerations    Episiotomy: None       Anesthesia    Method: Epidural       Operative Delivery    Forceps attempted?: No  Vacuum extractor attempted?: No       Shoulder Dystocia    Shoulder dystocia present?: No        Delivery    Time head delivered: 10/29/2023 02:20:55  Birth date/time: 10/29/2025 02:17:47  Delivery type: , Low Transverse   categorization: primary   priority: routine  Indications for : Cephalopelvic Disproportion  Incision type: T-shaped  Complications: Failure to Progress in Second Stage, Hemorrhage       Resuscitation    Method: Supplemental oxygen       Apgars    Living status: Living  Apgar Component Scores:  1 min.:  5 min.:  10 min.:  15 min.:  20 min.:    Skin color:         Heart rate:         Reflex irritability:         Muscle tone:         Respiratory effort:         Total:                Delivery Providers    Delivering clinician: Filemon Vasquez MD   Provider Role     Delivery Nurse     Nursery Nurse      Resident                 Filemon Vasquez MD    Reason for : Arrest of descent     Category: Non-scheduled, non-urgent  Additional Procedures:  Uterolysis,  Cystoscopy, Ureteral Stent Placement,   Anesthesia: epidural, general  Specimen: Placenta    Attending: Filemon Vasquez MD  Assistants: Jose Barnes    Informed Consent:  The risks, benefits, complications, and alternatives were discussed with the patient. The patient understood that the risks of  section include, but are not limited to: injury to nearby structures or organs, infection, blood loss and possible need for transfusion, and potential need for more surgery including hysterectomy. The patient stated understanding and desired to proceed. All questions were answered. The site of surgery was properly noted and marked. The patient was identified as Daysi Allen and the procedure verified as a  delivery. A Time Out was held and the above information confirmed.    Findings: Normal appearing gravid uterus, fallopian tubes, and ovaries. No significant adhesive disease.     Procedure Details:  The patient was taken back to the OR where she was prepped and draped in the usual sterile fashion in supine position with a left lateral tilt. SCDs were placed for DVT prophylaxis. A mcginnis catheter was placed and a vaginal prep performed. Preoperative antibiotics were administered.     A Pfannestiel incision was made. We dissected down to the level of the fascia using a combination of sharp dissection and electrocautery dissection. The fascia was incised in the midline and extended laterally sharply. The superior aspect of the fascia was then dissected off the underlying rectus muscles. The rectus muscles were divided bluntly in the midline. We entered the peritoneum bluntly and stretched to expose the uterus. A bladder blade was inserted.    A low transverse uterine incision was created using scalpel. We extended the  hysterostomy via gentle cephalocaudad stretch. The baby was deeply wedged into the pelvis and the head could not be safely elevated. The bandage scissors were used to T the incision. The fetal head could still not be delivered. A code pink was ordered. Following the T incision we decided to attempt to delivery the baby in a breech manner. We were able to bring both arms through the incision. As we were attempting to bring the hips and feet through the incision the head dislodged and came to the incision opening. The baby was delivered. The cord was clamped and cut.  We then handed the baby off to the waiting nurse. Cord blood and gases were attempted to be collected. The placenta was expressed, and the uterine cavity cleared of debris.  The uterus was exteriorized.    There was an extension on the patient's left side. That area was packed while the vertical portion of the T-incision was closed with 0-Monocryl. An area on the left angle where the extension was was grasped and a figure of 8 placed. The majority of hysterotomy incision was then closed.     Attention then turned to the extension. It was extensive and concern there was an extension into the vagina. Several figure of 8 sutures were placed to stop the bleeding. What was likely the uterine artery could be seen actively bleeding. The ends were grasped and a 2-0 vicryl stitch was placed. The bleeding was significantly less at this point but there was concern that the ureter could have been inadvertently tied off.     At this point GYN Oncology was called to aid in dissection. Please see their operative note for their portion of the procedure. Following extensive ureterolysis and repair of the vagina by GYN/ONC it was noted the left ureter had been inadvertently tied off.     At this point urology was called and a stent was placed. Please see their operative note for the complete details of their procedure. Following release of the ureter and stent placement  the case was turned back over to us.     The bladder, peritoneum, and muscle were inspected and noted to be hemostatic and free of injury. Interceed was placed over the T portion of the uterine incision. The peritoneum was closed with 2-0 Vicryl. The fascia was closed in a single running layer with 0-PDS suture. The subcutaneous layer was irrigated, and electrocautery used to obtain hemostasis. The skin was closed in a running subcuticular suture using 4-O Monocryl A sterile dressing was placed. Rectal Tylenol was given.     The patient tolerated the procedure well. All sponge, lap, and needle counts were correct x 2. The patient was brought back to her room in stable condition. An X-ray was performed as the other sets used by Gyn Oncology and Urology had not had time to be counted appropriately.

## 2023-10-29 NOTE — CARE PLAN
"  Problem: Postpartum  Goal: Experiences normal postpartum course  Outcome: Progressing  Flowsheets (Taken 10/29/2023 1903)  Experiences normal postpartum course: Fundal and lochia asssessments w/fundal massage, bladder emptying  Goal: No s/sx of hemorrhage  Outcome: Progressing  Flowsheets (Taken 10/29/2023 1903)  No s/sx of hemorrhage:   Fundal + lochia assessments w/fundal massage, bladder emptying, intrauterine device when indicated   Monitor QBL and vital signs     Problem: Pain - Adult  Goal: Verbalizes/displays adequate comfort level or baseline comfort level  Outcome: Progressing  Flowsheets (Taken 10/29/2023 1903)  Verbalizes/displays adequate comfort level or baseline comfort level:   Assess pain using appropriate pain scale   Administer analgesics based on type and severity of pain and evaluate response   The patient's goals for the shift include \"To heal safely and recovery to meet her baby in the NICU.\"    The clinical goals for the shift include maintain acceptable level of pain management for patient    Over the shift, the patient did make progress toward the following goals.   "

## 2023-10-29 NOTE — L&D DELIVERY NOTE
OB Delivery Note  10/29/2023  Daysi Batestahir Allen  18 y.o.   , Low Transverse        Gestational Age: 40w6d  /Para:   Quantitative Blood Loss: Admission to Discharge: 2890 mL (10/27/2023  6:46 PM - 10/29/2023  6:15 AM)    Daniela Allen [[id number]]      Labor Events    Rupture date/time: 10/28/2023 1000  Rupture type: Artificial  Fluid color: Meconium  Fluid odor: None  Complications: Failure to Progress in Second Stage, Hemorrhage       Labor Event Times    Dilation complete date/time: 10/28/2023 2206  Start pushing date/time: 10/28/2023 2210       Placenta    Placenta delivery date/time: 10/29/2023 0219  Placenta removal: Manual removal  Placenta appearance: Intact  Placenta disposition: pathology       Cord    Vessels: 3 vessels  Complications: None  Delayed cord clamping?: No  Cord clamped date/time: 10/29/2023 0218  Cord blood disposition: Lab  Gases sent?: Yes  Stem cell collection (by provider): No       Lacerations    Episiotomy: None       Anesthesia    Method: Epidural       Operative Delivery    Forceps attempted?: No  Vacuum extractor attempted?: No       Shoulder Dystocia    Shoulder dystocia present?: No        Delivery    Time head delivered: 10/29/2023 02:20:55  Birth date/time: 10/29/2025 02:17:47  Delivery type: , Low Transverse   categorization: primary   priority: routine  Indications for : Cephalopelvic Disproportion  Incision type: T-shaped  Complications: Failure to Progress in Second Stage, Hemorrhage       Resuscitation    Method: Supplemental oxygen       Apgars    Living status: Living  Apgar Component Scores:  1 min.:  5 min.:  10 min.:  15 min.:  20 min.:    Skin color:         Heart rate:         Reflex irritability:         Muscle tone:         Respiratory effort:         Total:                Delivery Providers    Delivering clinician: Filemon Vasquez MD   Provider Role     Delivery Nurse     Nursery Nurse      Resident                 Filemon Vasquez MD

## 2023-10-29 NOTE — ANESTHESIA PROCEDURE NOTES
Peripheral IV  Date/Time: 10/29/2023 3:30 AM      Placement  Needle size: 18 G  Laterality: right  Location: hand  Local anesthetic: none  Site prep: chlorhexidine  Technique: anatomical landmarks  Attempts: 1

## 2023-10-29 NOTE — PROGRESS NOTES
Postpartum Progress Note    Assessment/Plan   Daysi Allen is a 18 y.o., , POD30 s/p 1CS at 40w6d for deep OT arrest, complicated by need for uterine T-incision, ureteral suture ligation s/p ureterolysis and uterine artery artery ligation.    s/p  Delivery on 10/29 under GETA  - uterine T incision, delivery complicated by deeply impacted fetal head, hysterotomy repair complicated by ureteral suture ligation -> left ureteral stent (urology), left ureterolysis + uterine artery ligation + vaginal extension repair (Gyn Onc)  - Nap[place] to remain in place for 2-3 days with active void trial upon removal  - pain regimen per ERAS  - pre-op Hgb 12.8 -> EBL 2890 -> 2u PRBC intra-op -> post Hgb 12.0  - dvt risk score 8, for lovenox ppx at this time  - SW consulted for coping    O2 Requirement  - 1L NC when sleeping to maintain >92%  - likely atelectasis 2/2 recent intubation, medications and not yet ambulating  - also intermittently tachycardic since delivery, however asympt  - will continue to monitor    Contraception  - did not receive IUD 2/2 operative complications  - to readdress when able    Dispo: continue care with [facility] service    Discussed with and seen by Dr Frankie Dykes MD  Postpartum Medically Complicated (p. [phone])     Principal Problem:    Arrest of descent, delivered, current hospitalization  Active Problems:    Encounter for induction of labor    40 weeks gestation of pregnancy    Pregnancy Problems (from 03/10/23 to present)       Problem Noted Resolved    Arrest of descent, delivered, current hospitalization 10/29/2023 by Ekta Garcia MD No    Priority:  Medium      40 weeks gestation of pregnancy 10/27/2023 by ZEFERINO Raya No    Priority:  Medium      Encounter for induction of labor 10/21/2023 by ZEFERINO Clark No    Priority:  Medium      Anxiety 10/6/2023 by ZEFERINO Yang No    Priority:  Medium      Overview  Signed 10/12/2023  3:20 PM by Brittany Gómez V, APRN-CNM, APRN-CNP     Taking zoloft 100mg                   Subjective   Her pain is well controlled with current medications  She is tolerating a Adult diet Regular    Patient is tired, but denies any acute concerns.     Objective   Allergies:   Amoxicillin-pot clavulanate and Peanut         Last Vitals:  Temp Pulse Resp BP MAP Pulse Ox   36.8 °C (98.2 °F) (!) 119 16 118/58   (!) 92 %     Vitals Min/Max Last 24 Hours:  Temp  Min: 36.3 °C (97.3 °F)  Max: 37.4 °C (99.3 °F)  Pulse  Min: 70  Max: 155  Resp  Min: 12  Max: 20  BP  Min: 111/62  Max: 147/90    Intake/Output:     Intake/Output Summary (Last 24 hours) at 10/29/2023 1217  Last data filed at 10/29/2023 1144  Gross per 24 hour   Intake 12453.57 ml   Output 5902 ml   Net 7258.57 ml       Physical Exam:  General: Examination reveals a well developed, well nourished, female, in no acute distress. She is tired appearing.  Lungs: 1 L NC when sleeping.  Cardiac: warm and well perfused.  Abdomen: incisional dressing c/d/i.  Incision: covered by dressing.  Extremities: no redness or tenderness in the calves or thighs, no edema.  Neurological: no focal findgins.  Psychological: emotional when awake.    Lab Data:  Lab Results   Component Value Date    WBC 24.1 (H) 10/29/2023    HGB 12.0 10/29/2023    HCT 34.1 (L) 10/29/2023     10/29/2023       Patient seen and evaluated independently. Agree with assessment & plan as documented    Camila David MD

## 2023-10-29 NOTE — OP NOTE
Date: 10/29/2023  OR Location: [facility] OB    Name: Daysi Allen, : 2005, Age: 18 y.o., MRN: [id number], Sex: female    Diagnosis  * No Diagnosis Codes entered * * No Diagnosis Codes entered *     Procedures  Left ureterolysis  Left sided radical dissection with ligation of uterine vessels  Abdominal repair of vaginal laceration    Surgeons   Brina Stearns MD    Resident/Fellow/Other Assistant:  Ekta Cox MD      Procedure Summary  Anesthesia: Epidural  ASA: II  Anesthesia Staff: Anesthesiologist: James Stanford MD; Farhana Durant DO; Akosua Aguilar MD  Anesthesia Resident: Bakari Zelaya MD; Polly Angel MD; Nevin Dale MD; Danica Hughes MD  Estimated Blood Loss: ~50cc for this portion of the procedure  Intra-op Medications: * Intraprocedure medication information is unavailable because the case start and end events have not been set *           Anesthesia Record               Intraprocedure I/O Totals          Intake    Transfuse .00 mL    LR 5500.00 mL    Propofol Drip 0.00 mL    The total shown is the total volume documented since Anesthesia Start was filed.    Oxytocin Drip 63.57 mL    The total shown is the total volume documented since Anesthesia Start was filed.    Phenylephrine Drip 12.00 mL    The total shown is the total volume documented since Anesthesia Start was filed.    fentaNYL (PF)-BUPivacaine - Epi 1.25 mcg/mL- 0.044 % 330.00 mL    Total Intake 6605.57 mL       Output    Urine 785 mL    Total Blood Loss - Surgical Delivery (mL) 2894 mL    NG/OG Tube Output 40 mL    Total Output 3719 mL       Net    Net Volume 2886.57 mL       Other (could not be determined as input or output)    Surgical Delivery Estimated Blood Loss (mL) 2890          Specimen:   ID Type Source Tests Collected by Time   1 :  Tissue PLACENTA SINGLE SURGICAL PATHOLOGY EXAM Filemon Vasquez MD 10/29/2023 0219        Staff:   Jaylin Scrub: Estevan HER  Nicanor  Scrub Person: Janeth MARTE Valenzuela    Procedure Details:  We were called in to assist with challenging surgery c/b left sided hysterotomy extension into the lateral vagina with hemorrhage and concern for ureteral injury.  Upon our arrival the team had successfully managed the hemorrhage and only minimal pooling of blood was noted near the left sided vaginal laceration.  The team reported concern for ligation of the left ureter while obtaining hemostasis during hemorrhage control.      We began by opening up the left pelvic sidewall superior and lateral to the IP. We ligated and opened the left round ligament.  We were then able to explore the retroperitoneum and identify the left ureter.  We began ureterolysis with careful sharp and blunt dissection using cautery when needed.  We dissected out the ureter down to the level of the vaginal laceration where it was noted to be kinked/ ligated by a vicryl suture.  There was mild hydroureter noted proximal to the suture. The ureter was quite redundant of note.  We then dissected out the uterine vessels which were partially ligated by the suture that was around the ureter.  We dissected the vessels out to their origin at the iliac vessels.  We placed two 0-vicryl ties around the uterine artery and cut in between these.  We identified multiple uterine venous branches which were ligated with clips and incised.  Once we were confident that we had secured this left sided blood flow we removed the suture that was kinking the ureter.  Of note it appeared that the suture was cleanly around the ureter, and not through it.  After removal of this stitch we further dissected the ureter out until its entrance into the posterior bladder wall.  There were no further obstructions noted.  The ureter and bladder appeared intact.  We called in urology to evaluate for stenting vs reimplant.  While awaiting their arrival we repaired the vaginal laceration with 0-vicryl in a running fashion,  starting at the apex and working proximally. At this point urology came in and placed an indwelling left ureteral stent.  We confirmed hemostasis remained good and turned the case back over to the obstetric team.      Complications:  None; patient tolerated the procedure well.     Disposition: PACU - hemodynamically stable.  Condition: stable    Sharan Cox MD    I was present for the entirety of the procedure(s).     Brina Stearns MD

## 2023-10-30 LAB
ALBUMIN SERPL BCP-MCNC: 2.4 G/DL (ref 3.4–5)
ALP SERPL-CCNC: 101 U/L (ref 33–110)
ALT SERPL W P-5'-P-CCNC: 8 U/L (ref 7–45)
ANION GAP SERPL CALC-SCNC: 8 MMOL/L (ref 10–20)
AST SERPL W P-5'-P-CCNC: 19 U/L (ref 9–39)
BILIRUB SERPL-MCNC: 0.4 MG/DL (ref 0–1.2)
BLOOD EXPIRATION DATE: NORMAL
BUN SERPL-MCNC: 13 MG/DL (ref 6–23)
CALCIUM SERPL-MCNC: 8.1 MG/DL (ref 8.6–10.6)
CHLORIDE SERPL-SCNC: 107 MMOL/L (ref 98–107)
CO2 SERPL-SCNC: 24 MMOL/L (ref 21–32)
CREAT SERPL-MCNC: 0.74 MG/DL (ref 0.5–1.05)
DISPENSE STATUS: NORMAL
ERYTHROCYTE [DISTWIDTH] IN BLOOD BY AUTOMATED COUNT: 13.7 % (ref 11.5–14.5)
GFR SERPL CREATININE-BSD FRML MDRD: >90 ML/MIN/1.73M*2
GLUCOSE SERPL-MCNC: 59 MG/DL (ref 74–99)
HCT VFR BLD AUTO: 29.8 % (ref 36–46)
HGB BLD-MCNC: 9.9 G/DL (ref 12–16)
MCH RBC QN AUTO: 32.4 PG (ref 26–34)
MCHC RBC AUTO-ENTMCNC: 33.2 G/DL (ref 32–36)
MCV RBC AUTO: 97 FL (ref 80–100)
NRBC BLD-RTO: 0 /100 WBCS (ref 0–0)
PLATELET # BLD AUTO: 164 X10*3/UL (ref 150–450)
PMV BLD AUTO: 11.1 FL (ref 7.5–11.5)
POTASSIUM SERPL-SCNC: 4.1 MMOL/L (ref 3.5–5.3)
PRODUCT BLOOD TYPE: 5100
PRODUCT CODE: NORMAL
PROT SERPL-MCNC: 4.5 G/DL (ref 6.4–8.2)
RBC # BLD AUTO: 3.06 X10*6/UL (ref 4–5.2)
SODIUM SERPL-SCNC: 135 MMOL/L (ref 136–145)
UNIT ABO: NORMAL
UNIT NUMBER: NORMAL
UNIT RH: NORMAL
UNIT VOLUME: 309
WBC # BLD AUTO: 20.3 X10*3/UL (ref 4.4–11.3)

## 2023-10-30 PROCEDURE — 80053 COMPREHEN METABOLIC PANEL: CPT | Performed by: STUDENT IN AN ORGANIZED HEALTH CARE EDUCATION/TRAINING PROGRAM

## 2023-10-30 PROCEDURE — 2500000001 HC RX 250 WO HCPCS SELF ADMINISTERED DRUGS (ALT 637 FOR MEDICARE OP): Performed by: STUDENT IN AN ORGANIZED HEALTH CARE EDUCATION/TRAINING PROGRAM

## 2023-10-30 PROCEDURE — 2500000004 HC RX 250 GENERAL PHARMACY W/ HCPCS (ALT 636 FOR OP/ED): Performed by: STUDENT IN AN ORGANIZED HEALTH CARE EDUCATION/TRAINING PROGRAM

## 2023-10-30 PROCEDURE — 36415 COLL VENOUS BLD VENIPUNCTURE: CPT | Performed by: STUDENT IN AN ORGANIZED HEALTH CARE EDUCATION/TRAINING PROGRAM

## 2023-10-30 PROCEDURE — 96372 THER/PROPH/DIAG INJ SC/IM: CPT | Performed by: STUDENT IN AN ORGANIZED HEALTH CARE EDUCATION/TRAINING PROGRAM

## 2023-10-30 PROCEDURE — 85027 COMPLETE CBC AUTOMATED: CPT | Performed by: STUDENT IN AN ORGANIZED HEALTH CARE EDUCATION/TRAINING PROGRAM

## 2023-10-30 PROCEDURE — 1100000001 HC PRIVATE ROOM DAILY

## 2023-10-30 RX ADMIN — IBUPROFEN 600 MG: 600 TABLET ORAL at 13:25

## 2023-10-30 RX ADMIN — ACETAMINOPHEN 975 MG: 325 TABLET ORAL at 13:25

## 2023-10-30 RX ADMIN — KETOROLAC TROMETHAMINE 30 MG: 30 INJECTION, SOLUTION INTRAMUSCULAR; INTRAVENOUS at 01:39

## 2023-10-30 RX ADMIN — IBUPROFEN 600 MG: 600 TABLET ORAL at 07:33

## 2023-10-30 RX ADMIN — ACETAMINOPHEN 975 MG: 325 TABLET ORAL at 19:31

## 2023-10-30 RX ADMIN — SODIUM CHLORIDE, POTASSIUM CHLORIDE, SODIUM LACTATE AND CALCIUM CHLORIDE 125 ML/HR: 600; 310; 30; 20 INJECTION, SOLUTION INTRAVENOUS at 19:32

## 2023-10-30 RX ADMIN — ENOXAPARIN SODIUM 40 MG: 100 INJECTION SUBCUTANEOUS at 13:25

## 2023-10-30 RX ADMIN — ACETAMINOPHEN 975 MG: 325 TABLET ORAL at 01:39

## 2023-10-30 RX ADMIN — IBUPROFEN 600 MG: 600 TABLET ORAL at 19:31

## 2023-10-30 RX ADMIN — ACETAMINOPHEN 975 MG: 325 TABLET ORAL at 07:33

## 2023-10-30 RX ADMIN — SERTRALINE 100 MG: 100 TABLET, FILM COATED ORAL at 13:25

## 2023-10-30 ASSESSMENT — PAIN SCALES - GENERAL
PAINLEVEL_OUTOF10: 4
PAINLEVEL_OUTOF10: 6
PAINLEVEL_OUTOF10: 6
PAINLEVEL_OUTOF10: 8
PAINLEVEL_OUTOF10: 5 - MODERATE PAIN
PAINLEVEL_OUTOF10: 8
PAINLEVEL_OUTOF10: 7
PAINLEVEL_OUTOF10: 7
PAINLEVEL_OUTOF10: 8

## 2023-10-30 ASSESSMENT — PAIN DESCRIPTION - DESCRIPTORS
DESCRIPTORS: ACHING;SORE
DESCRIPTORS: ACHING

## 2023-10-30 NOTE — LACTATION NOTE
This note was copied from a baby's chart.  Lactation Consultant Note  Lactation Consultation   Nancy Oleaton    Maternal Information   18 year old first baby.  Had an unplanned  with this baby.     Maternal Assessment   Mom has a bruise on her right breast @ 11:00 at the top of the areola.  This nipple has a large skin tag on the nipple at around @ 17:00.  She has a small crack at the very tip of this nipple as well.  The left breast was not assessed.      Infant Assessment   Baby active and alert.  Having difficulty opening mouth and getting tongue out of the way for a deep comfortable latch.    Feeding Assessment   Baby becoming frustrated with inability to get positioned on the breast.     LATCH TOOL   A number 20 mm nipple shield was brought to bedside to assist infant with latching.     Breast Pump     FOB was shown how to set up the Symphony breast pump and the initiation  program was reviewed with him as the mom was holding and feeding baby.    Other OB Lactation Tools     Mom has use of the Symphony breast pump in the NICU and at [facility].  She has a pump for home use. She was encouraged to pump every 3 hours/8 times per day when she is not nursing the baby.   Recommendations/Summary       I met with parents at pt's bedside to explained availability of Lactation Consult services. Reviewed patient instruction materials and use of Symphony electric breast pump. Mom was assisted to latch infant to her right breast.  She was shown how to put baby in a football hold as well as a cross cradle hold.  This session was hampered by the fact that mom had a  section and was too sore to move form her wheelchair to a lounger chair.  This made postioning the baby for a deep comfortable latch challenging.  Mom was given a # 20 mm nipple shield to use with latching the baby to help correct for the less than ideal positioning.  Mom was encouraged to return to [facility] and pump if she cannot return to pt's  bedside every 3 hours for feeding.  She was encouraged engage in self care by resting and keeping up with her pain control.  She was instructed to not cause further nipple trauma by using a genital suction on her pump and making certain her breast shield fit properly.  Mom was invited to follow up with Lactation services as needed.

## 2023-10-30 NOTE — PROGRESS NOTES
Daysi Allen is a 18 y.o. female was admitted to [facility] at 40 wga which progressed to a traumatic .  Infant was transferred to the NICU for infant tachycardia and needing NC support.     SW was consulted for coping with the difficult birth. SW introduced herself to mom, later dad came into the room.  Mom and dad (Samuel) live together and recently got . They confirmed they have everything for infant at home, including car seat and bassinet.  Confirmed they are familiar with safe sleep practices.     Pt currently takes zoloft for anxiety, which has been helpful.  She was going to counseling, but it was not a good fit. She has an appointment to meet with a  therapist in about 3 weeks. She is looking forward to it, plans to keep the appointment. Mom was open to talking about the difficult birth.  SW provided supportive counseling at bedside.  Provided mom with resources on local post partum support groups and resources.  At this time she does not need any referrals.      SW provided [facility] information to dad, since he is adding infant to his insurance. Parents plan to take infant to [facility] for pediatric care.     Please consult social work as needed. Mom and infant are cleared for discharge when medically ready.     LIBERTY Guo

## 2023-10-30 NOTE — CARE PLAN
The patient's goals for the shift include visiting the baby in the nicu and pumping    The clinical goals for the shift include pain control    Patient's pain was controlled with tylenol/toradol and PCA pump. Urine output improved after fluid bolus. Was able to visit baby in the nicu and latch for a feed.

## 2023-10-30 NOTE — LACTATION NOTE
Lactation Consultant Note  Lactation Consultation  Reason for Consult: Initial assessment, NICU baby  Consultant Name: Alina Parra RN, IBCLC    Maternal Information  Has mother  before?: No  Infant to breast within first 2 hours of birth?: No  Breastfeeding Delayed Due to: Infant status    Maternal Assessment  Breast Assessment: Medium, Compressible, Red/inflamed (reddened area to 11 o'clock area of right breast per mother from infant latching improperly)  Nipple Assessment: Intact, Red/inflamed  Areola Assessment: Reddened (post pumping with size 17 mm adapters for 24 mm flanges)    Infant Assessment  Infant Behavior:  (infant in NICU)    Feeding Assessment  Nutrition Source: Breastmilk  Feeding Method: Nursing at the breast, Feeding expressed breastmilk    LATCH TOOL       Breast Pump  Pump: Hospital grade electric pump  Frequency: 8-10 times per day  Duration: Initiate phase  Breast Shield Size and Type: 24 mm, Other (comment) (size 17 mm inserts from home to fit with 24 mm flanges)  Units of Volume: mL per session    Other OB Lactation Tools  Lactation Tools: Flanges    Patient Follow-up  Inpatient Lactation Follow-up Needed : Yes  Outpatient Lactation Follow-up: Recommended    Other OB Lactation Documentation  Maternal Risk Factors: Complicated delivery    Recommendations/Summary  Upon entering the room, mother pumping with size 24 mm flanges and size 17 mm flange inserts inside. Mother states feels comfortable, measured her nipple size for flange prior to pumping. After pumping, areolas a bit reddened however mother states no pain/discomfort with pumping. Mother pumped out just over 1 ml combined from both breasts.    Reviewed with mother and father use of pump including initiate program, goal of pumping 8-10 times in a 24 hour period, how to assess for proper flange size, proper cleaning of pump parts as well as milk storage guidelines.    Mother states worked with lactation in NICU with  breastfeeding this morning. Reviewed with mother some options for positioning infant while feeding as well as discussed characteristics of a deep and proper latch and how that assists with maternal comfort as well as adequate milk transfer.    Mother has a breast pump for home use. Outpatient lactation resources discussed with mother. I encouraged mother to call for any questions, concerns or assistance.

## 2023-10-30 NOTE — PROGRESS NOTES
"Daysi Allen is a 18 y.o. female on day 3 of admission presenting with Arrest of descent, delivered, current hospitalization.    Subjective   -patient feeling well this AM  -not experiencing irritative bladder symptoms or flank discomfort  -Mcginnis catheter remains in place, draining clear yellow urine     Objective     Physical Exam  General: resting comfortably in bed  Neuro: alert and oriented  Head/Neck: normocephalic, atraumatic  ENT: moist mucous membranes  CV: regular rate  Pulm: nonlabored breathing on room air  Abd: soft, nondistended, nontender, examination of incision deferred  : mcginnis catheter draining clear yellow urine  MSK: MARTELL, no gross deformities  Psych: mood and behavior normal      Last Recorded Vitals  Blood pressure 116/77, pulse 108, temperature 36.3 °C (97.3 °F), temperature source Temporal, resp. rate 16, height 1.549 m (5' 1\"), weight 87.5 kg (193 lb), SpO2 98 %, currently breastfeeding.  Intake/Output last 3 Shifts:  I/O last 3 completed shifts:  In: 82146.5 (158.9 mL/kg) [I.V.:7547.5 (86.2 mL/kg); Blood:700; IV Piggyback:5500]  Out: 6437 (73.5 mL/kg) [Urine:3460 (1.1 mL/kg/hr); Emesis/NG output:40; Blood:2937]  Weight: 87.5 kg     Relevant Results  Scheduled medications  acetaminophen, 975 mg, oral, q6h  enoxaparin, 40 mg, subcutaneous, q24h  ketorolac, 30 mg, intravenous, q6h   Followed by  ibuprofen, 600 mg, oral, q6h  ibuprofen, 600 mg, oral, q6h  sertraline, 100 mg, oral, Daily      Continuous medications  fentaNYL (PF)-BUPivacaine - Epi, 0-54 mL/hr  HYDROmorphone,   lactated Ringer's, 125 mL/hr, Last Rate: 125 mL/hr (10/29/23 1530)      PRN medications  PRN medications: benzocaine-menthoL, bisacodyl, carboprost, diphenhydrAMINE **OR** diphenhydrAMINE, diphenhydrAMINE, hydrALAZINE, [Held by provider] HYDROmorphone, labetaloL, lanolin, lidocaine, loperamide, magnesium hydroxide, measles, mumps and rubella, methylergonovine, miSOPROStoL, nalbuphine, naloxone, naloxone, " NIFEdipine, ondansetron **OR** ondansetron, oxyCODONE, [Held by provider] oxyCODONE, oxyCODONE, [Held by provider] oxyCODONE, oxygen, oxygen, oxytocin, oxytocin, oxytocin, oxytocin, oxytocin, polyethylene glycol, prochlorperazine **OR** prochlorperazine **OR** prochlorperazine, promethazine **OR** promethazine, psyllium, simethicone, tranexamic acid, witch hazel    Results for orders placed or performed during the hospital encounter of 10/27/23 (from the past 24 hour(s))   CBC   Result Value Ref Range    WBC 24.1 (H) 4.4 - 11.3 x10*3/uL    nRBC 0.0 0.0 - 0.0 /100 WBCs    RBC 3.62 (L) 4.00 - 5.20 x10*6/uL    Hemoglobin 12.0 12.0 - 16.0 g/dL    Hematocrit 34.1 (L) 36.0 - 46.0 %    MCV 94 80 - 100 fL    MCH 33.1 26.0 - 34.0 pg    MCHC 35.2 32.0 - 36.0 g/dL    RDW 13.0 11.5 - 14.5 %    Platelets 180 150 - 450 x10*3/uL    MPV 11.0 7.5 - 11.5 fL   Comprehensive metabolic panel   Result Value Ref Range    Glucose 76 74 - 99 mg/dL    Sodium 136 136 - 145 mmol/L    Potassium 4.5 3.5 - 5.3 mmol/L    Chloride 109 (H) 98 - 107 mmol/L    Bicarbonate 21 21 - 32 mmol/L    Anion Gap 11 10 - 20 mmol/L    Urea Nitrogen 11 6 - 23 mg/dL    Creatinine 0.73 0.50 - 1.05 mg/dL    eGFR >90 >60 mL/min/1.73m*2    Calcium 8.0 (L) 8.6 - 10.6 mg/dL    Albumin 2.6 (L) 3.4 - 5.0 g/dL    Alkaline Phosphatase 120 (H) 33 - 110 U/L    Total Protein 4.2 (L) 6.4 - 8.2 g/dL    AST 24 9 - 39 U/L    Bilirubin, Total 0.9 0.0 - 1.2 mg/dL    ALT 10 7 - 45 U/L   Fibrinogen   Result Value Ref Range    Fibrinogen 321 200 - 400 mg/dL   Coagulation Screen   Result Value Ref Range    Protime 12.2 9.8 - 12.8 seconds    INR 1.1 0.9 - 1.1    aPTT 29 27 - 38 seconds       XR abdomen 1 view    Result Date: 10/29/2023  FINDINGS: Left double-J ureteral stent is noted. Incompletely characterized wires projecting over the left upper quadrant, mid abdomen and extending over the left lower quadrant. Few surgical clips overlie the left pelvic region. No foreign body suggestive  of retained surgical instruments. Radiopaque catheter overlying the lumbar spine most consistent with epidural catheter   Nonobstructive bowel gas pattern.   Osseous structures demonstrate no acute bony changes.         Assessment/Plan   Principal Problem:    Arrest of descent, delivered, current hospitalization  Active Problems:    Encounter for induction of labor    40 weeks gestation of pregnancy    Daysi Allen is a 18 y.o. year old female who underwent  section 10/29/23 during which there was concern for L ureteral injury and is now s/p cystoscopy and L ureteral JJ stent placement. Postoperative KUB showed stent with bilateral curls. Patient is recovering well and has no discomfort from the stent. Creatinine is not elevated and patient has good urine output. The need for outpatient follow-up for stent removal followed by ultrasound was discussed with the patient.    Recommendations  - Maintain L ureteral JJ stent for 6 weeks  - Follow up for outpatient stent removal, to be scheduled by urology  - Renal ultrasound outpatient following stent removal  - Continue to trend creatinine while inpatient  - Management of mcginnis catheter per primary team  - Urology team to sign off, please page with further questions or concerns    Seen with chief resident Dr. Stroud, to be discussed with attending Dr. Sulaiman Rodriguez MD  PGY-1 Urology [place]  Pager: [phone]

## 2023-10-30 NOTE — PROGRESS NOTES
Postpartum Progress Note    Assessment/Plan   Daysi Allen is a 18 y.o., , POD1 s/p 1 x CS at 40w6d for deep OT arrest, complicated by need for uterine T-incision, ureteral suture ligation s/p ureterolysis and uterine artery artery ligation.    s/p  Delivery on 10/29 under GETA  - uterine T incision, delivery complicated by deeply impacted fetal head, hysterotomy repair complicated by ureteral suture ligation -> left ureteral stent (urology), left ureterolysis + uterine artery ligation + vaginal extension repair (Gyn Onc)  - Napier to remain in place for 2-3 days with active void trial upon removal  - pain regimen per ERAS  - pre-op Hgb 12.8 -> EBL 2890 -> 2u PRBC intra-op -> post Hgb 12.0  - dvt risk score 8, for lovenox ppx at this time  - SW consulted for coping    O2 Requirement  - 1L NC when sleeping to maintain >92%  - likely atelectasis 2/2 recent intubation, medications and not yet ambulating  - also intermittently tachycardic since delivery, however asympt  - will continue to monitor    Contraception  - did not receive IUD 2/2 operative complications  - to readdress when able    Dispo: continue care with [facility] service    Discussed with Dr. Oralia Morgan MD  Postpartum Medically Complicated (p. [phone])     Principal Problem:    Arrest of descent, delivered, current hospitalization  Active Problems:    Encounter for induction of labor    40 weeks gestation of pregnancy    Pregnancy Problems (from 03/10/23 to present)       Problem Noted Resolved    Arrest of descent, delivered, current hospitalization 10/29/2023 by Ekta Garcia MD No    Priority:  Medium      40 weeks gestation of pregnancy 10/27/2023 by ZEFERINO Raya No    Priority:  Medium      Encounter for induction of labor 10/21/2023 by ZEFERINO Clark No    Priority:  Medium      Anxiety 10/6/2023 by ZEFERINO Yang No    Priority:  Medium      Overview Signed  10/12/2023  3:20 PM by Brittany Gómez V, APRN-CNM, APRN-CNP     Taking zoloft 100mg               Subjective   Her pain is well controlled with current medications  She is tolerating a Adult diet Regular    Patient is tired, but denies any acute concerns.     Objective   Allergies:   Amoxicillin-pot clavulanate and Peanut         Last Vitals:  Temp Pulse Resp BP MAP Pulse Ox   36.6 °C (97.9 °F) 105 14 107/61   98 %     Vitals Min/Max Last 24 Hours:  Temp  Min: 36.1 °C (97 °F)  Max: 36.8 °C (98.2 °F)  Pulse  Min: 96  Max: 123  Resp  Min: 14  Max: 18  BP  Min: 97/47  Max: 129/83    Intake/Output:     Intake/Output Summary (Last 24 hours) at 10/30/2023 0734  Last data filed at 10/30/2023 0500  Gross per 24 hour   Intake 3497.9 ml   Output 4542 ml   Net -1044.1 ml     Physical Exam:  General: Examination reveals a well developed, well nourished, female, in no acute distress. She is tired appearing.  Lungs: 1 L NC when sleeping.  Cardiac: warm and well perfused.  Abdomen: incisional dressing c/d/i.  Incision: covered by dressing.  Extremities: no redness or tenderness in the calves or thighs, no edema.  Neurological: no focal findgins.  Psychological: emotional when awake.    Lab Data:  Lab Results   Component Value Date    WBC 24.1 (H) 10/29/2023    HGB 12.0 10/29/2023    HCT 34.1 (L) 10/29/2023     10/29/2023

## 2023-10-31 PROBLEM — Z96.0 STATUS POST PLACEMENT OF URETERAL STENT: Status: ACTIVE | Noted: 2023-10-31

## 2023-10-31 LAB
ALBUMIN SERPL BCP-MCNC: 2.3 G/DL (ref 3.4–5)
ALP SERPL-CCNC: 105 U/L (ref 33–110)
ALT SERPL W P-5'-P-CCNC: 9 U/L (ref 7–45)
ANION GAP SERPL CALC-SCNC: 13 MMOL/L (ref 10–20)
AST SERPL W P-5'-P-CCNC: 16 U/L (ref 9–39)
BILIRUB SERPL-MCNC: 0.2 MG/DL (ref 0–1.2)
BLOOD EXPIRATION DATE: NORMAL
BUN SERPL-MCNC: 13 MG/DL (ref 6–23)
CALCIUM SERPL-MCNC: 7.6 MG/DL (ref 8.6–10.6)
CHLORIDE SERPL-SCNC: 108 MMOL/L (ref 98–107)
CO2 SERPL-SCNC: 22 MMOL/L (ref 21–32)
CREAT SERPL-MCNC: 0.58 MG/DL (ref 0.5–1.05)
DISPENSE STATUS: NORMAL
ERYTHROCYTE [DISTWIDTH] IN BLOOD BY AUTOMATED COUNT: 13.1 % (ref 11.5–14.5)
GFR SERPL CREATININE-BSD FRML MDRD: >90 ML/MIN/1.73M*2
GLUCOSE SERPL-MCNC: 64 MG/DL (ref 74–99)
HCT VFR BLD AUTO: 25.3 % (ref 36–46)
HGB BLD-MCNC: 8.6 G/DL (ref 12–16)
MCH RBC QN AUTO: 32.2 PG (ref 26–34)
MCHC RBC AUTO-ENTMCNC: 34 G/DL (ref 32–36)
MCV RBC AUTO: 95 FL (ref 80–100)
NRBC BLD-RTO: 0 /100 WBCS (ref 0–0)
PLATELET # BLD AUTO: 205 X10*3/UL (ref 150–450)
PMV BLD AUTO: 10.9 FL (ref 7.5–11.5)
POTASSIUM SERPL-SCNC: 3.7 MMOL/L (ref 3.5–5.3)
PRODUCT BLOOD TYPE: 5100
PRODUCT BLOOD TYPE: 600
PRODUCT CODE: NORMAL
PROT SERPL-MCNC: 4 G/DL (ref 6.4–8.2)
RBC # BLD AUTO: 2.67 X10*6/UL (ref 4–5.2)
SODIUM SERPL-SCNC: 139 MMOL/L (ref 136–145)
UNIT ABO: NORMAL
UNIT NUMBER: NORMAL
UNIT RH: NORMAL
UNIT VOLUME: 242
UNIT VOLUME: 259
UNIT VOLUME: 305
UNIT VOLUME: 350
WBC # BLD AUTO: 17.4 X10*3/UL (ref 4.4–11.3)
XM INTEP: NORMAL
XM INTEP: NORMAL

## 2023-10-31 PROCEDURE — 2500000004 HC RX 250 GENERAL PHARMACY W/ HCPCS (ALT 636 FOR OP/ED): Performed by: STUDENT IN AN ORGANIZED HEALTH CARE EDUCATION/TRAINING PROGRAM

## 2023-10-31 PROCEDURE — 1100000001 HC PRIVATE ROOM DAILY

## 2023-10-31 PROCEDURE — 96372 THER/PROPH/DIAG INJ SC/IM: CPT | Performed by: STUDENT IN AN ORGANIZED HEALTH CARE EDUCATION/TRAINING PROGRAM

## 2023-10-31 PROCEDURE — 36415 COLL VENOUS BLD VENIPUNCTURE: CPT | Performed by: STUDENT IN AN ORGANIZED HEALTH CARE EDUCATION/TRAINING PROGRAM

## 2023-10-31 PROCEDURE — 80053 COMPREHEN METABOLIC PANEL: CPT | Performed by: STUDENT IN AN ORGANIZED HEALTH CARE EDUCATION/TRAINING PROGRAM

## 2023-10-31 PROCEDURE — 85027 COMPLETE CBC AUTOMATED: CPT | Performed by: STUDENT IN AN ORGANIZED HEALTH CARE EDUCATION/TRAINING PROGRAM

## 2023-10-31 PROCEDURE — 2500000001 HC RX 250 WO HCPCS SELF ADMINISTERED DRUGS (ALT 637 FOR MEDICARE OP): Performed by: STUDENT IN AN ORGANIZED HEALTH CARE EDUCATION/TRAINING PROGRAM

## 2023-10-31 PROCEDURE — 99232 SBSQ HOSP IP/OBS MODERATE 35: CPT | Performed by: STUDENT IN AN ORGANIZED HEALTH CARE EDUCATION/TRAINING PROGRAM

## 2023-10-31 RX ORDER — HYDROMORPHONE HYDROCHLORIDE 1 MG/ML
0.2 INJECTION, SOLUTION INTRAMUSCULAR; INTRAVENOUS; SUBCUTANEOUS EVERY 2 HOUR PRN
Status: DISCONTINUED | OUTPATIENT
Start: 2023-10-31 | End: 2023-11-01 | Stop reason: HOSPADM

## 2023-10-31 RX ADMIN — IBUPROFEN 600 MG: 600 TABLET ORAL at 13:19

## 2023-10-31 RX ADMIN — ACETAMINOPHEN 975 MG: 325 TABLET ORAL at 01:45

## 2023-10-31 RX ADMIN — ACETAMINOPHEN 975 MG: 325 TABLET ORAL at 18:49

## 2023-10-31 RX ADMIN — ENOXAPARIN SODIUM 40 MG: 100 INJECTION SUBCUTANEOUS at 13:19

## 2023-10-31 RX ADMIN — ACETAMINOPHEN 975 MG: 325 TABLET ORAL at 07:18

## 2023-10-31 RX ADMIN — IBUPROFEN 600 MG: 600 TABLET ORAL at 07:18

## 2023-10-31 RX ADMIN — IBUPROFEN 600 MG: 600 TABLET ORAL at 01:45

## 2023-10-31 RX ADMIN — ACETAMINOPHEN 975 MG: 325 TABLET ORAL at 13:19

## 2023-10-31 RX ADMIN — Medication: at 00:39

## 2023-10-31 RX ADMIN — IBUPROFEN 600 MG: 600 TABLET ORAL at 18:49

## 2023-10-31 RX ADMIN — SERTRALINE 100 MG: 100 TABLET, FILM COATED ORAL at 13:19

## 2023-10-31 ASSESSMENT — PAIN SCALES - GENERAL
PAINLEVEL_OUTOF10: 5 - MODERATE PAIN
PAINLEVEL_OUTOF10: 7
PAINLEVEL_OUTOF10: 3
PAINLEVEL_OUTOF10: 7

## 2023-10-31 ASSESSMENT — PAIN DESCRIPTION - DESCRIPTORS: DESCRIPTORS: SORE

## 2023-10-31 NOTE — PROGRESS NOTES
Daysi Allen is a 18 y.o., , who had a , Low Transverse  delivery on 10/29/2023  at 40w6d and is now POD2.    She had a general anesthetic following an epidural without immediate complications noted.       Pain was appropriately tolerated by patient.     Vitals:    10/31/23 0350   BP: 121/73   Pulse: (!) 121   Resp: 16   Temp: 37.3 °C (99.1 °F)   SpO2: 97%       Neuraxial site assessed. No visible redness or swelling. Pain acceptably controlled. Patient able to ambulate and move all extremities without difficulty. Napier remains in place, patient has passed flatus. No complaints of nausea/vomiting. Tolerating PO intake well. No s/sx of PDPH.     Neuraxial site without redness, drainage, swelling.  Neuromuscular block resolved.    Anesthesia will sign off     Kyle Huerta, CAA

## 2023-10-31 NOTE — DISCHARGE INSTRUCTIONS

## 2023-10-31 NOTE — LACTATION NOTE
Lactation Consultant Note  Lactation Consultation  Reason for Consult: Follow-up assessment  Consultant Name: KINZA Evans    Maternal Information  Exclusive Pump and Bottle Feed: Yes    Maternal Assessment       Infant Assessment       Feeding Assessment       LATCH TOOL       Breast Pump  Pump: Hospital grade electric pump  Frequency: 5-7 times per day  Breast Shield Size and Type: Other (comment) (17mm)  Units of Volume: Drops    Other OB Lactation Tools  Lactation Tools: Flanges    Patient Follow-up  Inpatient Lactation Follow-up Needed : Yes    Other OB Lactation Documentation       Recommendations/Summary  Parents concerned over volume they are getting at this time. Discussed normal patterns with pumping and colostrum. Discussed breast gymnastics, reverse pressure softening, andrew importance of continuing with pumping 8 times in 24 hours for supply. Encouraged mother to get rest as able as well.

## 2023-10-31 NOTE — PROGRESS NOTES
Postpartum Progress Note    Assessment/Plan   Daysi Allen is a 18 y.o., , POD2 s/p 1 x CS at 40w6d for deep OT arrest, complicated by need for uterine T-incision, ureteral suture ligation s/p ureterolysis and uterine artery artery ligation.    s/p  Delivery on 10/29 under GETA  - uterine T incision, delivery complicated by deeply impacted fetal head, hysterotomy repair complicated by ureteral suture ligation -> left ureteral stent (urology), left ureterolysis + uterine artery ligation + vaginal extension repair (Gyn Onc)  - ureteral stent to remain in place for 6w per urology  - Cr stable 0.7   - Napier in place, for active void trial upon removal. Discussed with urology, appropriate to dc today   - Pain well controlled with Dilaudid PCA, plan to transition to ERAS PO pain meds with PRN breakthrough dilaudid   - pre-op Hgb 12.8 -> EBL 2890 -> 2u PRBC intra-op -> POD0 Hgb 12.0 -> POD1 Hgb 9.9, f/up AM CBC   - dvt risk score 8, lovenox ppx  - SW consulted for coping    O2 Requirement  - brief 1L O2 requirement while sleeping POD#1, now on RA   - likely atelectasis 2/2 recent intubation, medications and not yet ambulating    Contraception  - did not receive IUD 2/2 operative complications  - will readdress     Dispo: continue care with [facility] service    Discussed with Dr. Bisi Escobar MD  Postpartum Medically Complicated (p. [phone])     Principal Problem:    Arrest of descent, delivered, current hospitalization  Active Problems:    Encounter for induction of labor    40 weeks gestation of pregnancy    Pregnancy Problems (from 03/10/23 to present)       Problem Noted Resolved    Arrest of descent, delivered, current hospitalization 10/29/2023 by Ekta Garcia MD No    Priority:  Medium      40 weeks gestation of pregnancy 10/27/2023 by ZEFERINO Raya No    Priority:  Medium      Encounter for induction of labor 10/21/2023 by ZEFERINO Clark No    Priority:   Medium      Anxiety 10/6/2023 by Buffy Gore, LANEY-NEVIN No    Priority:  Medium      Overview Signed 10/12/2023  3:20 PM by LANEY Miranda-CNM, APRN-CNP     Taking zoloft 100mg               Subjective       Objective   Allergies:   Amoxicillin-pot clavulanate and Peanut         Last Vitals:  Temp Pulse Resp BP MAP Pulse Ox   37.3 °C (99.1 °F) (!) 121 16 121/73   97 %     Vitals Min/Max Last 24 Hours:  Temp  Min: 36.7 °C (98.1 °F)  Max: 37.4 °C (99.3 °F)  Pulse  Min: 99  Max: 121  Resp  Min: 16  Max: 18  BP  Min: 107/73  Max: 121/73    Intake/Output:     Intake/Output Summary (Last 24 hours) at 10/31/2023 0820  Last data filed at 10/31/2023 0700  Gross per 24 hour   Intake 213.69 ml   Output 2850 ml   Net -2636.31 ml     Physical Exam:  General: Examination reveals a well developed, well nourished, female, in no acute distress. She is alert and cooperative.  Lungs: clear to auscultation bilaterally.  Cardiac: warm and well perfused.  Abdomen: incisional dressing c/d/i.  Incision: covered by dressing.  Extremities: no redness or tenderness in the calves or thighs, no edema.  Neurological: no focal findgins.  Psychological: mood stable     Lab Data:  Lab Results   Component Value Date    WBC 20.3 (H) 10/30/2023    HGB 9.9 (L) 10/30/2023    HCT 29.8 (L) 10/30/2023     10/30/2023

## 2023-10-31 NOTE — CARE PLAN
VSS, bleeding and swelling minimal, pain controlled with medications, pumping and giving donor milk.

## 2023-11-01 VITALS
HEIGHT: 61 IN | HEART RATE: 91 BPM | BODY MASS INDEX: 36.44 KG/M2 | DIASTOLIC BLOOD PRESSURE: 84 MMHG | OXYGEN SATURATION: 95 % | WEIGHT: 193 LBS | SYSTOLIC BLOOD PRESSURE: 123 MMHG | TEMPERATURE: 98.4 F | RESPIRATION RATE: 16 BRPM

## 2023-11-01 PROBLEM — Z34.90 ENCOUNTER FOR INDUCTION OF LABOR (HHS-HCC): Status: RESOLVED | Noted: 2023-10-21 | Resolved: 2023-11-01

## 2023-11-01 PROBLEM — Z3A.40 40 WEEKS GESTATION OF PREGNANCY (HHS-HCC): Status: RESOLVED | Noted: 2023-10-27 | Resolved: 2023-11-01

## 2023-11-01 LAB
ERYTHROCYTE [DISTWIDTH] IN BLOOD BY AUTOMATED COUNT: 13 % (ref 11.5–14.5)
HCT VFR BLD AUTO: 27.5 % (ref 36–46)
HGB BLD-MCNC: 8.7 G/DL (ref 12–16)
MCH RBC QN AUTO: 32.3 PG (ref 26–34)
MCHC RBC AUTO-ENTMCNC: 31.6 G/DL (ref 32–36)
MCV RBC AUTO: 102 FL (ref 80–100)
NRBC BLD-RTO: 0 /100 WBCS (ref 0–0)
PLATELET # BLD AUTO: 241 X10*3/UL (ref 150–450)
PMV BLD AUTO: 10.3 FL (ref 7.5–11.5)
RBC # BLD AUTO: 2.69 X10*6/UL (ref 4–5.2)
WBC # BLD AUTO: 14.2 X10*3/UL (ref 4.4–11.3)

## 2023-11-01 PROCEDURE — 99238 HOSP IP/OBS DSCHRG MGMT 30/<: CPT | Performed by: STUDENT IN AN ORGANIZED HEALTH CARE EDUCATION/TRAINING PROGRAM

## 2023-11-01 PROCEDURE — 96372 THER/PROPH/DIAG INJ SC/IM: CPT | Performed by: STUDENT IN AN ORGANIZED HEALTH CARE EDUCATION/TRAINING PROGRAM

## 2023-11-01 PROCEDURE — 36415 COLL VENOUS BLD VENIPUNCTURE: CPT | Performed by: STUDENT IN AN ORGANIZED HEALTH CARE EDUCATION/TRAINING PROGRAM

## 2023-11-01 PROCEDURE — 85027 COMPLETE CBC AUTOMATED: CPT | Performed by: STUDENT IN AN ORGANIZED HEALTH CARE EDUCATION/TRAINING PROGRAM

## 2023-11-01 PROCEDURE — 2500000004 HC RX 250 GENERAL PHARMACY W/ HCPCS (ALT 636 FOR OP/ED): Performed by: STUDENT IN AN ORGANIZED HEALTH CARE EDUCATION/TRAINING PROGRAM

## 2023-11-01 PROCEDURE — 2500000001 HC RX 250 WO HCPCS SELF ADMINISTERED DRUGS (ALT 637 FOR MEDICARE OP): Performed by: STUDENT IN AN ORGANIZED HEALTH CARE EDUCATION/TRAINING PROGRAM

## 2023-11-01 RX ORDER — NALOXONE HYDROCHLORIDE 4 MG/.1ML
4 SPRAY NASAL AS NEEDED
Qty: 6 EACH | Refills: 11 | Status: SHIPPED | OUTPATIENT
Start: 2023-11-01 | End: 2023-12-14 | Stop reason: ALTCHOICE

## 2023-11-01 RX ORDER — ACETAMINOPHEN 500 MG
1000 TABLET ORAL EVERY 8 HOURS PRN
Qty: 30 TABLET | Refills: 0 | Status: SHIPPED | OUTPATIENT
Start: 2023-11-01

## 2023-11-01 RX ORDER — IBUPROFEN 600 MG/1
600 TABLET ORAL EVERY 6 HOURS PRN
Qty: 60 TABLET | Refills: 0 | Status: SHIPPED | OUTPATIENT
Start: 2023-11-01

## 2023-11-01 RX ORDER — POLYETHYLENE GLYCOL 3350 17 G/17G
17 POWDER, FOR SOLUTION ORAL DAILY
Qty: 10 PACKET | Refills: 1 | Status: SHIPPED | OUTPATIENT
Start: 2023-11-01 | End: 2023-12-14 | Stop reason: ALTCHOICE

## 2023-11-01 RX ORDER — OXYCODONE HYDROCHLORIDE 5 MG/1
5 TABLET ORAL EVERY 6 HOURS PRN
Qty: 15 TABLET | Refills: 0 | Status: SHIPPED | OUTPATIENT
Start: 2023-11-01 | End: 2023-12-14 | Stop reason: ALTCHOICE

## 2023-11-01 RX ADMIN — ACETAMINOPHEN 975 MG: 325 TABLET ORAL at 00:42

## 2023-11-01 RX ADMIN — IBUPROFEN 600 MG: 600 TABLET ORAL at 00:42

## 2023-11-01 RX ADMIN — SERTRALINE 100 MG: 100 TABLET, FILM COATED ORAL at 13:51

## 2023-11-01 RX ADMIN — IBUPROFEN 600 MG: 600 TABLET ORAL at 06:54

## 2023-11-01 RX ADMIN — ENOXAPARIN SODIUM 40 MG: 100 INJECTION SUBCUTANEOUS at 14:03

## 2023-11-01 RX ADMIN — IBUPROFEN 600 MG: 600 TABLET ORAL at 13:51

## 2023-11-01 RX ADMIN — ACETAMINOPHEN 975 MG: 325 TABLET ORAL at 13:51

## 2023-11-01 RX ADMIN — ACETAMINOPHEN 975 MG: 325 TABLET ORAL at 06:54

## 2023-11-01 ASSESSMENT — PAIN SCALES - GENERAL
PAINLEVEL_OUTOF10: 0 - NO PAIN
PAINLEVEL_OUTOF10: 6
PAINLEVEL_OUTOF10: 5 - MODERATE PAIN
PAINLEVEL_OUTOF10: 5 - MODERATE PAIN

## 2023-11-01 NOTE — CARE PLAN
VSS, bleeding and swelling minimal, pain controlled with medications, pumping and giving donor breast milk.

## 2023-11-01 NOTE — DISCHARGE SUMMARY
Discharge Summary    Admission Date: 10/27/2023  Discharge Date: 2023    Discharge Diagnosis  Arrest of descent, delivered, current hospitalization    Hospital Course  Delivery Date: 10/29/2023  2:17 AM   Delivery type: , Low Transverse    GA at delivery: 40w6d  Outcome: Living   Anesthesia during delivery: Epidural;General   Intrapartum complications: Failure to Progress in Second Stage;Hemorrhage   Feeding method: Breastfeeding Status: Yes     Procedures:  pLTCS  Contraception at discharge: oral contraceptive, Slynd    Pt underwent pLTCS on 10/29 for arrest of decent with deeply imapcted fetal head, hysterotomy repair complicated by ureteral suture ligation -> left ureteral stent was placed (urology), left ureterolysis + uterine artery ligation + vaginal extension repair (Gyn Onc). pre-op Hgb 12.8 -> EBL 2890 -> 2u PRBC intra-op -> post Hgb 12.0 -> 9.9 -> 8.7. Was placed on PCA for pain control and transitioned to PO regimen on POD2, was tolerating well at time of discharge. Napier removed POD2, voiding freely. Meeting post operative milestones. Seen by SW for coping. Discharged with Slynd for BC, BF, plan for 1 week incision check, 4 week postpartum FUV, 6 week ureteral stent removal.    Pertinent Physical Exam At Time of Discharge  General: Well appearing, alert  HEENT: normocephalic, EOMI, clear sclera  Cardio: Warm and well perfused  Resp: breathing comfortably on room air  Abd: soft, nontender, nondistended  : fundus firm below umbilicus, incision c/d/i  Neuro: grossly intact, no focal deficits  Extremities: full ROM, no calf tenderness  Psych: A&O x3, appropriate mood and affect     Discharge Meds     Your medication list        ASK your doctor about these medications        Instructions Last Dose Given Next Dose Due   Prenatal Multi-DHA(with vit K) 27 mg iron-800 mcg-260 mg capsule  Generic drug: [id number]-[id number]-fish           sertraline 100 mg tablet  Commonly known as: Zoloft                      Complications Requiring Follow-Up  Left ureteral stent in place, to be removed in 6 weeks    Test Results Pending At Discharge  Pending Labs       Order Current Status    Blood Bank Hold Tube Collected (10/27/23 1900)    CBC Collected (11/01/23 0643)    Extra Tubes Collected (10/27/23 1900)    Lavender Top Collected (10/27/23 1900)    Red Top Collected (10/27/23 1900)    Surgical Pathology Exam - [facility]AC[facility] In process            Outpatient Follow-Up  Future Appointments   Date Time Provider Department Center   12/13/2023  8:30 AM Marcio Hilario MD [id number] [place]         Discussed and seen with Dr. Rudy Avila MD

## 2023-11-01 NOTE — LACTATION NOTE
Lactation Consultant Note  Lactation Consultation  Reason for Consult: Initial assessment  Consultant Name: Tiffany Hampton    Maternal Information  Has mother  before?: No  Infant to breast within first 2 hours of birth?: Yes  Exclusive Pump and Bottle Feed: No    Infant Assessment  Infant Behavior: Deep sleep    Feeding Assessment  Nutrition Source: Breastmilk, Donor human milk  Feeding Method: Nursing at the breast, Feeding expressed breastmilk, Syringe feeding, Paced bottle  Unable to assess infant feeding at this time: Other (Comment) (Baby sleeping and not due to feed)    Breast Pump  Pump: Hospital grade electric pump  Frequency: 5-7 times per day  Duration: 15-20 minutes per session  Breast Shield Size and Type: 24 mm  Units of Volume: Drops    Patient Follow-up  Inpatient Lactation Follow-up Needed : No  Outpatient Lactation Follow-up: Recommended  Lactation Professional - OK to Discharge: Yes    Other OB Lactation Documentation  Maternal Risk Factors: Significant hemorrhage    Recommendations/Summary  This mom had a complicated  delivery and had a significant hemorrhage of almost 3000 mls. She had a difficult recovery day yesterday and was not able to pump often, per her RN. Baby was in the NICU originally. Mom states that she received  lactation support in the NICU and that the NICU LC said that baby's latch was deep. Mom says that the latch is not painful and just feels like a strong pull.  She says that baby will typically feed for about 15-20 minutes and will often take both breasts. Parents will then supplement with donor milk via bottle after breastfeeding. Mom and baby are being discharged today and mom is hoping to be able to pump every 2-3 hours for 15-20 minutes at a time at home. She is frustrated that she is still only producing drops but we discussed that milk production is often delayed when there is excessive blood loss during delivery. Discussed that by stimulating her breasts  on a schedule, she is signaling to her body to produce more milk. Also discussed the importance of rest, hydration, and continuing to eat an extra 500 calories per day. Mom has a pump for at home. Her plan is to continue to latch every 2-3 hours and then supplement baby with available pumped milk, donor milk, and/or formula. She will also pump after feeds as often as possible. Encouraged her to follow up with outpatient lactation and provided parents with the flyer.

## 2023-11-02 LAB
LABORATORY COMMENT REPORT: NORMAL
PATH REPORT.FINAL DX SPEC: NORMAL
PATH REPORT.GROSS SPEC: NORMAL
PATH REPORT.RELEVANT HX SPEC: NORMAL
PATH REPORT.TOTAL CANCER: NORMAL

## 2023-11-02 NOTE — PROGRESS NOTES
Follow-up Phone Call Note:   Interview:  Care Type: Women's Health   Phone Number Called: [phone]   Call Outcome: left a message   Date/Time Of Call: 11/2/23 at 1438   Call Back Done By: care coordinator   Callback Complete:  Yes

## 2023-11-04 ENCOUNTER — HOSPITAL ENCOUNTER (EMERGENCY)
Facility: HOSPITAL | Age: 18
Discharge: HOME | End: 2023-11-04
Attending: EMERGENCY MEDICINE
Payer: COMMERCIAL

## 2023-11-04 ENCOUNTER — APPOINTMENT (OUTPATIENT)
Dept: RADIOLOGY | Facility: HOSPITAL | Age: 18
End: 2023-11-04
Payer: COMMERCIAL

## 2023-11-04 VITALS
HEIGHT: 61 IN | WEIGHT: 180 LBS | RESPIRATION RATE: 16 BRPM | HEART RATE: 68 BPM | DIASTOLIC BLOOD PRESSURE: 90 MMHG | TEMPERATURE: 97.3 F | BODY MASS INDEX: 33.99 KG/M2 | OXYGEN SATURATION: 96 % | SYSTOLIC BLOOD PRESSURE: 134 MMHG

## 2023-11-04 DIAGNOSIS — T83.122A: Primary | ICD-10-CM

## 2023-11-04 PROCEDURE — 74018 RADEX ABDOMEN 1 VIEW: CPT | Performed by: SURGERY

## 2023-11-04 PROCEDURE — 74018 RADEX ABDOMEN 1 VIEW: CPT | Mod: FY

## 2023-11-04 PROCEDURE — 99283 EMERGENCY DEPT VISIT LOW MDM: CPT | Mod: 25 | Performed by: EMERGENCY MEDICINE

## 2023-11-04 RX ORDER — CEPHALEXIN 500 MG/1
500 CAPSULE ORAL 4 TIMES DAILY
Qty: 40 CAPSULE | Refills: 0 | Status: SHIPPED | OUTPATIENT
Start: 2023-11-04 | End: 2023-11-14

## 2023-11-04 ASSESSMENT — COLUMBIA-SUICIDE SEVERITY RATING SCALE - C-SSRS
6. HAVE YOU EVER DONE ANYTHING, STARTED TO DO ANYTHING, OR PREPARED TO DO ANYTHING TO END YOUR LIFE?: NO
2. HAVE YOU ACTUALLY HAD ANY THOUGHTS OF KILLING YOURSELF?: NO
1. IN THE PAST MONTH, HAVE YOU WISHED YOU WERE DEAD OR WISHED YOU COULD GO TO SLEEP AND NOT WAKE UP?: NO

## 2023-11-04 ASSESSMENT — LIFESTYLE VARIABLES
HAVE PEOPLE ANNOYED YOU BY CRITICIZING YOUR DRINKING: NO
EVER FELT BAD OR GUILTY ABOUT YOUR DRINKING: NO
HAVE YOU EVER FELT YOU SHOULD CUT DOWN ON YOUR DRINKING: NO
REASON UNABLE TO ASSESS: NO
EVER HAD A DRINK FIRST THING IN THE MORNING TO STEADY YOUR NERVES TO GET RID OF A HANGOVER: NO

## 2023-11-04 NOTE — ED PROVIDER NOTES
HPI   Chief Complaint   Patient presents with    Groin Pain     Pt states she has a urinary stent placed Saturday 10/28/2023 at [facility] after he had a  delivery and states now it feels like it is coming out       Patient is a very pleasant 18-year-old female who presents to the emergency department due to postoperative concerns.  Patient was hospitalized at [facility] for delivery.  She had arrest of delivery.  She ended up with stat  that was complicated by suspected left ureteral injury.  Patient had intraoperative double-J stent placed by urology.  She states since then, she has been doing well.  Her bleeding has almost totally resolved.  Today, she had some pain and what felt like spasm.  She states that she went to urinate and felt like the stent was moving.                          [place] Coma Scale Score: 15                  Patient History   Past Medical History:   Diagnosis Date    Other conditions influencing health status     Full term infant    Personal history of (healed) traumatic fracture 10/23/2017    History of fracture of nasal bone     Past Surgical History:   Procedure Laterality Date    OTHER SURGICAL HISTORY  2019    Tonsillectomy     No family history on file.  Social History     Tobacco Use    Smoking status: Never     Passive exposure: Never    Smokeless tobacco: Never   Vaping Use    Vaping Use: Not on file   Substance Use Topics    Alcohol use: Not on file    Drug use: Not Currently       Physical Exam   ED Triage Vitals [23 0016]   Temp Heart Rate Resp BP   36.3 °C (97.3 °F) 89 16 148/87      SpO2 Temp Source Heart Rate Source Patient Position   98 % Skin Monitor Sitting      BP Location FiO2 (%)     Left arm --       Physical Exam  Vitals and nursing note reviewed.   Constitutional:       General: She is not in acute distress.     Appearance: Normal appearance. She is well-developed.   HENT:      Head: Normocephalic and atraumatic.   Eyes:      Extraocular  Movements: Extraocular movements intact.      Conjunctiva/sclera: Conjunctivae normal.      Pupils: Pupils are equal, round, and reactive to light.   Cardiovascular:      Rate and Rhythm: Normal rate and regular rhythm.      Heart sounds: No murmur heard.  Pulmonary:      Effort: Pulmonary effort is normal. No respiratory distress.      Breath sounds: Normal breath sounds.   Abdominal:      General: Abdomen is flat.      Palpations: Abdomen is soft.      Tenderness: There is no abdominal tenderness.   Musculoskeletal:         General: No swelling.      Cervical back: Normal range of motion and neck supple.   Skin:     General: Skin is warm and dry.      Capillary Refill: Capillary refill takes less than 2 seconds.   Neurological:      General: No focal deficit present.      Mental Status: She is alert and oriented to person, place, and time.   Psychiatric:         Mood and Affect: Mood normal.         ED Course & MDM   Diagnoses as of 11/04/23 0500   Ureteral stent displacement, initial encounter (CMS/[facility])       Medical Decision Making  Medical Decision Making: Patient presents emergency department concern for stent disruption.  She did have a complicated recent hospitalization.  On visual examination, the stent is visible just at the urethral opening and is still coiled.  X-rays were obtained which do show migration of the stent.  I did discuss the patient with urology.  I was able to discuss the patient with Dr. Ling who was able to review the operative reports.  He did feel the stent placement was likely just more precautionary.  He did feel that is the patient is 7 days for operation that would be safe to remove the stent.  This was done at the bedside with female nurse chaperone.  Patient tolerated this well.  She will be placed on 5 days of antibiotics and will follow-up with urology.  I did  the patient on concerning symptoms and reasons to return.  Differential Diagnoses Considered: Stent migration,  bladder spasm    Chronic Medical Conditions Significantly Affecting Care: Recent hospitalization with     External Records Reviewed: I reviewed recent and relevant outside records including: Multiple operative notes  Independent Interpretation of Studies:  I independently interpreted: KUB obtained and reviewed    Escalation of Care:  Appropriate for outpatient management with urology follow-up    Social Determinants of Health Significantly Affecting Care:  No social determinants    Prescription Drug Consideration: Prescription for oral antibiotics    Diagnostic testing considered: [PERC, D-Dimer, PECARN, etc.]    Discussion of Management with Other Providers:   I discussed the patient/results with: Urology          Procedure  Procedures     Ethan Cortes MD  [id number]

## 2023-12-04 ENCOUNTER — TELEPHONE (OUTPATIENT)
Dept: OBSTETRICS AND GYNECOLOGY | Facility: CLINIC | Age: 18
End: 2023-12-04

## 2023-12-14 ENCOUNTER — POSTPARTUM VISIT (OUTPATIENT)
Dept: OBSTETRICS AND GYNECOLOGY | Facility: CLINIC | Age: 18
End: 2023-12-14
Payer: COMMERCIAL

## 2023-12-14 VITALS
SYSTOLIC BLOOD PRESSURE: 102 MMHG | BODY MASS INDEX: 31.91 KG/M2 | WEIGHT: 169 LBS | HEIGHT: 61 IN | DIASTOLIC BLOOD PRESSURE: 64 MMHG

## 2023-12-14 DIAGNOSIS — F41.9 ANXIETY: ICD-10-CM

## 2023-12-14 DIAGNOSIS — Z30.011 ENCOUNTER FOR INITIAL PRESCRIPTION OF CONTRACEPTIVE PILLS: Primary | ICD-10-CM

## 2023-12-14 PROCEDURE — 99213 OFFICE O/P EST LOW 20 MIN: CPT | Performed by: STUDENT IN AN ORGANIZED HEALTH CARE EDUCATION/TRAINING PROGRAM

## 2023-12-14 RX ORDER — SERTRALINE HYDROCHLORIDE 100 MG/1
100 TABLET, FILM COATED ORAL DAILY
Qty: 90 TABLET | Refills: 2 | Status: SHIPPED | OUTPATIENT
Start: 2023-12-14 | End: 2024-03-13

## 2023-12-14 RX ORDER — DROSPIRENONE 4 MG/1
4 TABLET, FILM COATED ORAL DAILY
Qty: 90 TABLET | Refills: 3 | Status: SHIPPED | OUTPATIENT
Start: 2023-12-14 | End: 2024-12-13

## 2023-12-14 ASSESSMENT — EDINBURGH POSTNATAL DEPRESSION SCALE (EPDS)
I HAVE BEEN ANXIOUS OR WORRIED FOR NO GOOD REASON: NO, NOT AT ALL
I HAVE BLAMED MYSELF UNNECESSARILY WHEN THINGS WENT WRONG: YES, SOME OF THE TIME
I HAVE LOOKED FORWARD WITH ENJOYMENT TO THINGS: AS MUCH AS I EVER DID
I HAVE FELT SAD OR MISERABLE: NO, NOT AT ALL
THINGS HAVE BEEN GETTING ON TOP OF ME: NO, I HAVE BEEN COPING AS WELL AS EVER
I HAVE BEEN ABLE TO LAUGH AND SEE THE FUNNY SIDE OF THINGS: AS MUCH AS I ALWAYS COULD
I HAVE BEEN SO UNHAPPY THAT I HAVE BEEN CRYING: NO, NEVER
THE THOUGHT OF HARMING MYSELF HAS OCCURRED TO ME: NEVER
I HAVE FELT SCARED OR PANICKY FOR NO GOOD REASON: NO, NOT MUCH
TOTAL SCORE: 3
I HAVE BEEN SO UNHAPPY THAT I HAVE HAD DIFFICULTY SLEEPING: NOT AT ALL

## 2023-12-14 ASSESSMENT — PAIN SCALES - GENERAL: PAINLEVEL: 0-NO PAIN

## 2023-12-14 NOTE — PROGRESS NOTES
Post Partum Visit  Subjective    Daysi Randi Allen is a 18 y.o.  presenting for postpartum follow-up:    Delivery Date: 10/29/2023   GA at Delivery: 40W6D  Type of Delivery: , Low Transverse      Pregnancy was complicated by:  Pregnancy Problems (from 03/10/23 to 23)       Problem Noted Resolved    Status post placement of ureteral stent 10/31/2023 by Randi Rodriguez MD No    An[facility]6/2023 [facility] Buff[facility]    Overview Signed 10/12/2023  3:20 PM by LANEY Miranda-CNM, APRN-CNP     Taking zoloft 100mg         Arrest of descent, delivered, current hospitalization 10/29/2023 by Ekta Garcia MD 2023 by Katie Stuart CMA    Overview Signed 10/29/2023 12:27 PM by Carolee Dykes MD     - uterine T incision, delivery complicated by deeply impacted fetal head, hysterotomy repair complicated by ureteral suture ligation -> left ureteral stent (urology), left ureterolysis + uterine artery ligation + vaginal extension repair (Gyn Onc)  - EBL 2900         40 weeks gestation of pregnancy 10/27/2023 by LANEY [facility] 2023 by Subha Avila MD    Encounter for induction of labor 10/21/2023 by LANEY [facility] 2023 by Subha Avila MD            Concerns: vaginal dryness    Pain: none  Lacerations: none  Menses: No    Sexual Health Concerns: Yes - dryness as above  Contraceptive Method:  Slynd    Feeding Method: She is bottle feeding.   Lactation Consult Needed?: No    Birth Trauma: Yes, describe: reviewed complications of  section sand recommendations for future pregnancies  Baby:   Information for the patient's :  Alice Enamorado [[id number]]   Alice Enamorado ,   Information for the patient's :  Alice Enamorado [[id number]]   female   Bonding: doing well without issue    Mood: anxiety well controlled          Objective   Vitals:    23 1459   BP: 102/64        Physical Exam  Constitutional:        General: She is not in acute distress.     Appearance: Normal appearance.   HENT:      Head: Normocephalic.   Eyes:      General: No scleral icterus.     Extraocular Movements: Extraocular movements intact.   Cardiovascular:      Rate and Rhythm: Normal rate and regular rhythm.   Pulmonary:      Effort: Pulmonary effort is normal. No respiratory distress.   Neurological:      General: No focal deficit present.      Mental Status: She is alert and oriented to person, place, and time.   Skin:     General: Skin is warm and dry.   Psychiatric:         Mood and Affect: Mood normal.         Behavior: Behavior normal.         Thought Content: Thought content normal.         Judgment: Judgment normal.   Vitals reviewed.              Assessment/Plan   Problem List Items Addressed This Visit             ICD-10-CM    Anxiety F41.9    Relevant Medications    sertraline (Zoloft) 100 mg tablet     Other Visit Diagnoses         Codes    Encounter for initial prescription of contraceptive pills    -  Primary Z30.011    Relevant Medications    drospirenone, contraceptive, (Slynd) 4 mg (28) tablet            Options for postpartum family planning were discussed. These included combination oral contraceptive options, progesterone only pills, implantable contraception, intrauterine devices, barrier methods, sterilization, and the rhythm method. The patient's questions were answered. She decided on slynd as her preferred method of contraception.     Reviewed expectations for vaginal dryness and management options with lubricants.     Follow up: 1 year for pap at age 19

## 2024-09-16 ENCOUNTER — APPOINTMENT (OUTPATIENT)
Dept: OBSTETRICS AND GYNECOLOGY | Facility: CLINIC | Age: 19
End: 2024-09-16
Payer: COMMERCIAL

## 2024-09-23 ENCOUNTER — APPOINTMENT (OUTPATIENT)
Dept: OBSTETRICS AND GYNECOLOGY | Facility: CLINIC | Age: 19
End: 2024-09-23
Payer: COMMERCIAL

## 2024-09-26 ENCOUNTER — APPOINTMENT (OUTPATIENT)
Dept: OBSTETRICS AND GYNECOLOGY | Facility: CLINIC | Age: 19
End: 2024-09-26
Payer: COMMERCIAL

## 2024-09-26 VITALS
BODY MASS INDEX: 23.98 KG/M2 | DIASTOLIC BLOOD PRESSURE: 60 MMHG | WEIGHT: 127 LBS | HEIGHT: 61 IN | SYSTOLIC BLOOD PRESSURE: 118 MMHG

## 2024-09-26 DIAGNOSIS — Z30.011 ENCOUNTER FOR INITIAL PRESCRIPTION OF CONTRACEPTIVE PILLS: Primary | ICD-10-CM

## 2024-09-26 DIAGNOSIS — Z31.69 ENCOUNTER FOR PRECONCEPTION CONSULTATION: ICD-10-CM

## 2024-09-26 PROCEDURE — 3008F BODY MASS INDEX DOCD: CPT | Performed by: STUDENT IN AN ORGANIZED HEALTH CARE EDUCATION/TRAINING PROGRAM

## 2024-09-26 PROCEDURE — 1036F TOBACCO NON-USER: CPT | Performed by: STUDENT IN AN ORGANIZED HEALTH CARE EDUCATION/TRAINING PROGRAM

## 2024-09-26 PROCEDURE — 99214 OFFICE O/P EST MOD 30 MIN: CPT | Performed by: STUDENT IN AN ORGANIZED HEALTH CARE EDUCATION/TRAINING PROGRAM

## 2024-09-26 RX ORDER — NORGESTIMATE AND ETHINYL ESTRADIOL 0.25-0.035
1 KIT ORAL DAILY
Qty: 112 TABLET | Refills: 3 | Status: SHIPPED | OUTPATIENT
Start: 2024-09-26 | End: 2025-09-26

## 2024-09-26 ASSESSMENT — PAIN SCALES - GENERAL: PAINLEVEL: 0-NO PAIN

## 2024-09-26 NOTE — PROGRESS NOTES
Subjective   Patient ID: Daysi Allen is a 19 y.o. female who presents for Follow-up (BCP's).    Wishes to discuss contraceptive options. Desires continuous pill to skip period.    Also concerned about future pregnancies and impact of previous delivery. Reports labor x 40 hours,  with deeply impacted head and uterine extensions into cervix/vagina. She is concerned about impacts on fertility and what delivery would look like in future pregnancy.      Objective   Physical Exam  Constitutional:       General: She is not in acute distress.     Appearance: Normal appearance.   HENT:      Head: Normocephalic.   Cardiovascular:      Rate and Rhythm: Normal rate.   Pulmonary:      Effort: Pulmonary effort is normal. No respiratory distress.   Skin:     General: Skin is warm and dry.   Neurological:      Mental Status: She is alert and oriented to person, place, and time.   Psychiatric:         Mood and Affect: Mood normal.         Behavior: Behavior normal.         Thought Content: Thought content normal.         Judgment: Judgment normal.         Assessment/Plan   Problem List Items Addressed This Visit             ICD-10-CM    Encounter for initial prescription of contraceptive pills - Primary Z30.011     Contraception management  - Discussion held today regarding reproductive life planning and family planning, as well as optimization of health for future pregnancies. Autonomy of patient respected and confidentiality discussed. All currently available methods of contraception discussed, including temporary and permanent methods. Indications, risks, benefits, bleeding patterns, expectations, usage instructions, and efficacy of each reviewed. Informed patient that no hormonal methods of contraception prevent acquisition or transmission of STDs, and that condoms should be used for that purpose if it is relevant to her exposure risk. CDC guidelines for STD testing should be followed for routine testing, in addition to  patient driven testing based on concerns, symptoms, and exposures. Ease of testing reviewed, and encouraged self-awareness of patient's own risks.   - Reproductive life-planning: Folic acid supplementation preconception, pregnancy spacing, medical condition optimization, medication and immunization review prior to any planned pregnancies, and preconception consult for certain high risk medical conditions all encouraged.  - Based on her current history, and based on [facility] guidelines, she is a candidate for all available methods except:  none  - Patient chooses to use:  Continuous OCP - sprintex Rx sent  - Reviewed instructions for initiation and continuation per current [facility] guidelines.  - Need for backup contraception reviewed based on current [facility] and [facility] guidelines. Questions answered. Call parameters reviewed.          Relevant Medications    norgestimate-ethinyl estradioL (Ortho-Cyclen) 0.25-35 mg-mcg tablet    Encounter for preconception consultation Z31.69     Reviewed no expected impact on fertility  Discussed option for elective repeat  scheduled in future pregnancies  Anticipating trying again in 6-12 months when baby is 18 months  No other concerns  RTC for annual with pap                 Ethan Vasques MD 24 11:46 AM

## 2024-09-26 NOTE — ASSESSMENT & PLAN NOTE
Contraception management  - Discussion held today regarding reproductive life planning and family planning, as well as optimization of health for future pregnancies. Autonomy of patient respected and confidentiality discussed. All currently available methods of contraception discussed, including temporary and permanent methods. Indications, risks, benefits, bleeding patterns, expectations, usage instructions, and efficacy of each reviewed. Informed patient that no hormonal methods of contraception prevent acquisition or transmission of STDs, and that condoms should be used for that purpose if it is relevant to her exposure risk. CDC guidelines for STD testing should be followed for routine testing, in addition to patient driven testing based on concerns, symptoms, and exposures. Ease of testing reviewed, and encouraged self-awareness of patient's own risks.   - Reproductive life-planning: Folic acid supplementation preconception, pregnancy spacing, medical condition optimization, medication and immunization review prior to any planned pregnancies, and preconception consult for certain high risk medical conditions all encouraged.  - Based on her current history, and based on [facility] guidelines, she is a candidate for all available methods except:  none  - Patient chooses to use:  Continuous OCP - sprintex Rx sent  - Reviewed instructions for initiation and continuation per current [facility] guidelines.  - Need for backup contraception reviewed based on current [facility] and [facility] guidelines. Questions answered. Call parameters reviewed.

## 2024-09-26 NOTE — ASSESSMENT & PLAN NOTE
Reviewed no expected impact on fertility  Discussed option for elective repeat  scheduled in future pregnancies  Anticipating trying again in 6-12 months when baby is 18 months  No other concerns  RTC for annual with pap

## 2024-11-22 ENCOUNTER — OFFICE VISIT (OUTPATIENT)
Dept: URGENT CARE | Age: 19
End: 2024-11-22
Payer: COMMERCIAL

## 2024-11-22 VITALS
WEIGHT: 125 LBS | RESPIRATION RATE: 18 BRPM | TEMPERATURE: 97.2 F | DIASTOLIC BLOOD PRESSURE: 75 MMHG | SYSTOLIC BLOOD PRESSURE: 118 MMHG | BODY MASS INDEX: 23 KG/M2 | HEIGHT: 62 IN | HEART RATE: 57 BPM | OXYGEN SATURATION: 97 %

## 2024-11-22 DIAGNOSIS — N30.90 CYSTITIS: ICD-10-CM

## 2024-11-22 LAB
POC APPEARANCE, URINE: CLEAR
POC BILIRUBIN, URINE: NEGATIVE
POC BLOOD, URINE: NEGATIVE
POC COLOR, URINE: YELLOW
POC GLUCOSE, URINE: NEGATIVE MG/DL
POC KETONES, URINE: NEGATIVE MG/DL
POC LEUKOCYTES, URINE: NEGATIVE
POC NITRITE,URINE: NEGATIVE
POC PH, URINE: 6 PH
POC PROTEIN, URINE: NEGATIVE MG/DL
POC SPECIFIC GRAVITY, URINE: 1.01
POC UROBILINOGEN, URINE: 0.2 EU/DL

## 2024-11-22 PROCEDURE — 87086 URINE CULTURE/COLONY COUNT: CPT

## 2024-11-22 NOTE — PATIENT INSTRUCTIONS
A sample will be sent for further testing (culture) to grow the bacteria and test susceptibility. If there is a need to change or add a medication based on this results, you will be notified.    Use Azo for the next 2 to 3 days.  Hydrate well with plenty of fluids.

## 2024-11-22 NOTE — PROGRESS NOTES
"Subjective   Patient ID: Daysi Allen is a 19 y.o. female. They present today with a chief complaint of Urinary Problem (X 2-3 weeks frequency, urgency, burning with urination, urinary pressure. Has tried Azo with moderate relief.).    Patient disposition: Home    History of Present Illness  HPI  Urinary symptoms for the past 3 weeks including frequency, urgency.  Symptoms initially started few weeks ago, took Azo for a few days, hydrated and symptoms significantly decreased.  More recently, have been mild consistently.  No GI symptoms, nausea, vomiting, back pain.  No other medications taken.  No fever or chills.  Last UTI about 6 months ago.      Past Medical History  Allergies as of 11/22/2024 - Reviewed 11/22/2024   Allergen Reaction Noted    Amoxicillin-pot clavulanate Hives 10/12/2023    Peanut Itching 02/11/2019       (Not in a hospital admission)            reports that she has never smoked. She has never been exposed to tobacco smoke. She has never used smokeless tobacco. She reports that she does not currently use alcohol. She reports that she does not currently use drugs.    Review of Systems  As noted in HPI. ROS otherwise negative unless noted.       Objective    Vitals:    11/22/24 1307   BP: 118/75   BP Location: Right arm   Patient Position: Sitting   BP Cuff Size: Small adult   Pulse: 57   Resp: 18   Temp: 36.2 °C (97.2 °F)   TempSrc: Temporal   SpO2: 97%   Weight: 56.7 kg (125 lb)   Height: 1.575 m (5' 2\")     Patient's last menstrual period was 11/15/2024.    Physical Exam  Constitutional: vital signs reviewed. Well developed, well nourished. patient alert and patient without distress.   Head and Face: Normal and atraumatic.      Cardiovascular: Heart rate normal, normal S1 and S2, no gallops, no murmurs and no pericardial rub. Rhythm: Normal.  Pulmonary: No respiratory distress. Palpation of chest: Normal. Clear bilateral breath sounds.   Abdomen: Soft nontender; no abdominal mass palpated. No " organomegaly.  Negative flank tenderness on percussion  Skin: Normal skin color and pigmentation, normal skin turgor, and no rash.        Procedures    Point of Care Test & Imaging Results from this visit           Diagnostic study results (if any) were reviewed.    Assessment/Plan   Allergies, medications, history, and pertinent labs/EKGs/Imaging reviewed.    Medical Decision Making  See note    Orders and Diagnoses  Diagnoses and all orders for this visit:  Urinary frequency      Medical Admin Record      Follow Up Instructions  No follow-ups on file.      Electronically signed by [facility]

## 2024-11-24 LAB — BACTERIA UR CULT: NORMAL

## 2024-12-22 ENCOUNTER — HOSPITAL ENCOUNTER (EMERGENCY)
Age: 19
Discharge: HOME | End: 2024-12-22
Payer: COMMERCIAL

## 2024-12-22 VITALS
OXYGEN SATURATION: 99 % | RESPIRATION RATE: 16 BRPM | TEMPERATURE: 98.3 F | DIASTOLIC BLOOD PRESSURE: 71 MMHG | WEIGHT: 116.6 LBS | HEART RATE: 103 BPM | HEIGHT: 61 IN | SYSTOLIC BLOOD PRESSURE: 94 MMHG | BODY MASS INDEX: 22.01 KG/M2

## 2024-12-22 DIAGNOSIS — R11.2 NAUSEA AND VOMITING, UNSPECIFIED VOMITING TYPE: Primary | ICD-10-CM

## 2024-12-22 LAB
ALBUMIN SERPL-MCNC: 4.9 G/DL (ref 3.5–4.6)
ALP SERPL-CCNC: 66 U/L (ref 40–130)
ALT SERPL-CCNC: 13 U/L (ref 0–33)
ANION GAP SERPL CALCULATED.3IONS-SCNC: 11 MEQ/L (ref 9–15)
AST SERPL-CCNC: 22 U/L (ref 0–35)
BASOPHILS # BLD: 0 K/UL (ref 0–0.2)
BASOPHILS NFR BLD: 0.4 %
BILIRUB SERPL-MCNC: 0.4 MG/DL (ref 0.2–0.7)
BUN SERPL-MCNC: 10 MG/DL (ref 6–20)
CALCIUM SERPL-MCNC: 9.2 MG/DL (ref 8.5–9.9)
CHLORIDE SERPL-SCNC: 109 MEQ/L (ref 95–107)
CO2 SERPL-SCNC: 26 MEQ/L (ref 20–31)
CREAT SERPL-MCNC: 0.6 MG/DL (ref 0.5–0.9)
EOSINOPHIL # BLD: 0 K/UL (ref 0–0.7)
EOSINOPHIL NFR BLD: 0.1 %
ERYTHROCYTE [DISTWIDTH] IN BLOOD BY AUTOMATED COUNT: 11.7 % (ref 11.5–14.5)
ETHANOL PERCENT: NORMAL G/DL
ETHANOLAMINE SERPL-MCNC: <10 MG/DL (ref 0–0.08)
GLOBULIN SER CALC-MCNC: 2.7 G/DL (ref 2.3–3.5)
GLUCOSE SERPL-MCNC: 88 MG/DL (ref 70–99)
HCT VFR BLD AUTO: 40.9 % (ref 37–47)
HGB BLD-MCNC: 14.4 G/DL (ref 12–16)
LYMPHOCYTES # BLD: 1.3 K/UL (ref 1–4.8)
LYMPHOCYTES NFR BLD: 11.4 %
MCH RBC QN AUTO: 33 PG (ref 27–31.3)
MCHC RBC AUTO-ENTMCNC: 35.2 % (ref 33–37)
MCV RBC AUTO: 93.8 FL (ref 79.4–94.8)
MONOCYTES # BLD: 0.3 K/UL (ref 0.2–0.8)
MONOCYTES NFR BLD: 3 %
NEUTROPHILS # BLD: 9.6 K/UL (ref 1.4–6.5)
NEUTS SEG NFR BLD: 84.8 %
PLATELET # BLD AUTO: 263 K/UL (ref 130–400)
POTASSIUM SERPL-SCNC: 3.8 MEQ/L (ref 3.4–4.9)
PROT SERPL-MCNC: 7.6 G/DL (ref 6.3–8)
RBC # BLD AUTO: 4.36 M/UL (ref 4.2–5.4)
SODIUM SERPL-SCNC: 146 MEQ/L (ref 135–144)
WBC # BLD AUTO: 11.3 K/UL (ref 4.8–10.8)

## 2024-12-22 PROCEDURE — 80053 COMPREHEN METABOLIC PANEL: CPT

## 2024-12-22 PROCEDURE — 85025 COMPLETE CBC W/AUTO DIFF WBC: CPT

## 2024-12-22 PROCEDURE — 2580000003 HC RX 258: Performed by: PHYSICIAN ASSISTANT

## 2024-12-22 PROCEDURE — 96374 THER/PROPH/DIAG INJ IV PUSH: CPT

## 2024-12-22 PROCEDURE — 99284 EMERGENCY DEPT VISIT MOD MDM: CPT

## 2024-12-22 PROCEDURE — 36415 COLL VENOUS BLD VENIPUNCTURE: CPT

## 2024-12-22 PROCEDURE — 96361 HYDRATE IV INFUSION ADD-ON: CPT

## 2024-12-22 PROCEDURE — 82077 ASSAY SPEC XCP UR&BREATH IA: CPT

## 2024-12-22 PROCEDURE — 6360000002 HC RX W HCPCS: Performed by: PHYSICIAN ASSISTANT

## 2024-12-22 RX ORDER — 0.9 % SODIUM CHLORIDE 0.9 %
1000 INTRAVENOUS SOLUTION INTRAVENOUS ONCE
Status: COMPLETED | OUTPATIENT
Start: 2024-12-22 | End: 2024-12-22

## 2024-12-22 RX ORDER — ONDANSETRON 2 MG/ML
4 INJECTION INTRAMUSCULAR; INTRAVENOUS ONCE
Status: COMPLETED | OUTPATIENT
Start: 2024-12-22 | End: 2024-12-22

## 2024-12-22 RX ORDER — ONDANSETRON 4 MG/1
4 TABLET, ORALLY DISINTEGRATING ORAL EVERY 8 HOURS PRN
Qty: 9 TABLET | Refills: 0 | Status: SHIPPED | OUTPATIENT
Start: 2024-12-22

## 2024-12-22 RX ADMIN — ONDANSETRON 4 MG: 2 INJECTION, SOLUTION INTRAMUSCULAR; INTRAVENOUS at 14:37

## 2024-12-22 RX ADMIN — SODIUM CHLORIDE 1000 ML: 9 INJECTION, SOLUTION INTRAVENOUS at 14:38

## 2024-12-22 ASSESSMENT — LIFESTYLE VARIABLES
HOW OFTEN DO YOU HAVE A DRINK CONTAINING ALCOHOL: MONTHLY OR LESS
HOW MANY STANDARD DRINKS CONTAINING ALCOHOL DO YOU HAVE ON A TYPICAL DAY: 1 OR 2

## 2024-12-22 ASSESSMENT — PAIN - FUNCTIONAL ASSESSMENT: PAIN_FUNCTIONAL_ASSESSMENT: NONE - DENIES PAIN

## 2024-12-22 NOTE — ED PROVIDER NOTES
noted above the remainder of the review of systems was reviewed and negative.       PAST MEDICAL HISTORY   History reviewed. No pertinent past medical history.      SURGICAL HISTORY       Past Surgical History:   Procedure Laterality Date    TONSILLECTOMY           CURRENT MEDICATIONS       Previous Medications    MEDROXYPROGESTERONE (DEPO-PROVERA) 150 MG/ML INJECTION    Inject 1 mL into the muscle once for 1 dose    NORGESTIMATE-ETHINYL ESTRADIOL (ORTHO-CYCLEN, 28,) 0.25-35 MG-MCG PER TABLET    Take 1 tablet by mouth daily       ALLERGIES     Amoxicillin, Nuts [peanut-containing drug products], and Pistachio nut extract    FAMILY HISTORY       Family History   Problem Relation Age of Onset    Breast Cancer Maternal Grandmother     Colon Cancer Neg Hx     Cancer Neg Hx     Diabetes Neg Hx     Eclampsia Neg Hx     Hypertension Neg Hx     Ovarian Cancer Neg Hx      Labor Neg Hx     Spont Abortions Neg Hx     Stroke Neg Hx           SOCIAL HISTORY       Social History     Socioeconomic History    Marital status: Single     Spouse name: None    Number of children: None    Years of education: None    Highest education level: None   Tobacco Use    Smoking status: Never    Smokeless tobacco: Never   Substance and Sexual Activity    Alcohol use: Yes     Comment: occasionally    Drug use: No    Sexual activity: Yes     Partners: Male     Social Determinants of Health     Financial Resource Strain: Low Risk  (10/28/2023)    Received from [facility]    Overall Financial Resource Strain (CARDIA)     Difficulty of Paying Living Expenses: Not hard at all   Food Insecurity: No Food Insecurity (10/27/2023)    Received from [facility]    Hunger Vital Sign     Worried About Running Out of Food in the Last Year: Never true     Ran Out of Food in the Last Year: Never true   Transportation Needs: No Transportation Needs (10/28/2023)    Received from [facility]

## 2024-12-22 NOTE — ED TRIAGE NOTES
Patient arrived to ER by private vehicle.   Patient states went out last night and drank more alcohol then usually.   Patient states has been vomiting and can't keep anything down.

## 2024-12-22 NOTE — ED NOTES
Patient sitting up in the bed at this time, fluids running, no further vomiting at this time. Fluids are still running.  Family at the bedside, call light with in reach, respirations even and unlabored.

## 2025-01-06 ENCOUNTER — OFFICE VISIT (OUTPATIENT)
Age: 20
End: 2025-01-06
Payer: COMMERCIAL

## 2025-01-06 VITALS
OXYGEN SATURATION: 99 % | TEMPERATURE: 97.2 F | BODY MASS INDEX: 23.41 KG/M2 | WEIGHT: 124 LBS | DIASTOLIC BLOOD PRESSURE: 72 MMHG | HEIGHT: 61 IN | HEART RATE: 88 BPM | SYSTOLIC BLOOD PRESSURE: 118 MMHG

## 2025-01-06 DIAGNOSIS — F90.9 ATTENTION DEFICIT HYPERACTIVITY DISORDER (ADHD), UNSPECIFIED ADHD TYPE: Primary | ICD-10-CM

## 2025-01-06 DIAGNOSIS — Z23 NEED FOR INFLUENZA VACCINATION: ICD-10-CM

## 2025-01-06 DIAGNOSIS — F41.9 ANXIETY: ICD-10-CM

## 2025-01-06 PROCEDURE — 99204 OFFICE O/P NEW MOD 45 MIN: CPT | Performed by: FAMILY MEDICINE

## 2025-01-06 PROCEDURE — 90661 CCIIV3 VAC ABX FR 0.5 ML IM: CPT | Performed by: FAMILY MEDICINE

## 2025-01-06 PROCEDURE — 90471 IMMUNIZATION ADMIN: CPT | Performed by: FAMILY MEDICINE

## 2025-01-06 RX ORDER — DEXTROAMPHETAMINE SACCHARATE, AMPHETAMINE ASPARTATE, DEXTROAMPHETAMINE SULFATE AND AMPHETAMINE SULFATE 2.5; 2.5; 2.5; 2.5 MG/1; MG/1; MG/1; MG/1
10 TABLET ORAL 2 TIMES DAILY
Qty: 60 TABLET | Refills: 0 | Status: SHIPPED | OUTPATIENT
Start: 2025-01-06 | End: 2025-02-05

## 2025-01-06 SDOH — ECONOMIC STABILITY: FOOD INSECURITY: WITHIN THE PAST 12 MONTHS, YOU WORRIED THAT YOUR FOOD WOULD RUN OUT BEFORE YOU GOT MONEY TO BUY MORE.: NEVER TRUE

## 2025-01-06 SDOH — ECONOMIC STABILITY: FOOD INSECURITY: WITHIN THE PAST 12 MONTHS, THE FOOD YOU BOUGHT JUST DIDN'T LAST AND YOU DIDN'T HAVE MONEY TO GET MORE.: NEVER TRUE

## 2025-01-06 SDOH — ECONOMIC STABILITY: INCOME INSECURITY: HOW HARD IS IT FOR YOU TO PAY FOR THE VERY BASICS LIKE FOOD, HOUSING, MEDICAL CARE, AND HEATING?: NOT HARD AT ALL

## 2025-01-06 ASSESSMENT — PATIENT HEALTH QUESTIONNAIRE - PHQ9
SUM OF ALL RESPONSES TO PHQ9 QUESTIONS 1 & 2: 0
1. LITTLE INTEREST OR PLEASURE IN DOING THINGS: NOT AT ALL
2. FEELING DOWN, DEPRESSED OR HOPELESS: NOT AT ALL
SUM OF ALL RESPONSES TO PHQ QUESTIONS 1-9: 0

## 2025-01-06 NOTE — PROGRESS NOTES
Chief Complaint   Patient presents with    New Patient     Patient reports she is wanting to establish care today. Patient saw a previous male doctor who she did not like at , wants to have medications filled by new pcp (but patient reported she is not taking anything at this time)        HPI: Nidia Rose 19 y.o. female presenting for   History of Present Illness  The patient is a 19-year-old female who presents for establishment of care.    Anxiety  She has a history of anxiety and was previously prescribed Zoloft 100 mg by her OB-GYN, which she discontinued due to perceived lack of efficacy. She was also seeing a telehealth psychiatrist, but she stopped seeing her after her 6-week checkup. She is not currently on any medications.     Birth control management  She is not currently using any form of birth control but plans to have an IUD placed in the coming months. She has not yet undergone a Pap smear but intends to schedule one with her OB-GYN, Dr. Srinivas Valdez, at the time of her IUD placement. She was screened for chlamydia and gonorrhea during her pregnancy.    ADHD  She has a confirmed diagnosis of ADHD, initially diagnosed by a physician at  in [place], who prescribed Adderall 10 mg (never started the medication). However, she discontinued this medication due to discomfort during office visits. She believes a reevaluation for ADHD is necessary, as she struggles with task completion and punctuality, particularly after the birth of her child on 10/23/2024. She expresses interest in returning to school but recalls difficulty focusing during her previous academic pursuits. She is willing to try medication for her ADHD. She has a history of special education classes due to attention issues, which were not addressed during her school years.    Supplemental Information  She had her tonsils taken out 7 years ago. She had an emergency  due to prolonged labor and ruptured membranes.    SOCIAL

## 2025-03-07 ENCOUNTER — OFFICE VISIT (OUTPATIENT)
Age: 20
End: 2025-03-07

## 2025-03-07 VITALS
WEIGHT: 125 LBS | SYSTOLIC BLOOD PRESSURE: 116 MMHG | TEMPERATURE: 97.5 F | OXYGEN SATURATION: 98 % | HEART RATE: 64 BPM | HEIGHT: 61 IN | BODY MASS INDEX: 23.6 KG/M2 | DIASTOLIC BLOOD PRESSURE: 64 MMHG

## 2025-03-07 DIAGNOSIS — F90.9 ATTENTION DEFICIT HYPERACTIVITY DISORDER (ADHD), UNSPECIFIED ADHD TYPE: ICD-10-CM

## 2025-03-07 DIAGNOSIS — Z02.83 ENCOUNTER FOR DRUG SCREENING: Primary | ICD-10-CM

## 2025-03-07 LAB
AMPHET UR QL SCN: NORMAL
BARBITURATES UR QL SCN: NORMAL
BENZODIAZ UR QL SCN: NORMAL
CANNABINOIDS UR QL SCN: NORMAL
COCAINE UR QL SCN: NORMAL
DRUG SCREEN COMMENT UR-IMP: NORMAL
FENTANYL SCREEN, URINE: NORMAL
METHADONE UR QL SCN: NORMAL
OPIATES UR QL SCN: NORMAL
OXYCODONE UR QL SCN: NORMAL
PCP UR QL SCN: NORMAL
PROPOXYPH UR QL SCN: NORMAL

## 2025-03-07 RX ORDER — DEXTROAMPHETAMINE SACCHARATE, AMPHETAMINE ASPARTATE, DEXTROAMPHETAMINE SULFATE AND AMPHETAMINE SULFATE 2.5; 2.5; 2.5; 2.5 MG/1; MG/1; MG/1; MG/1
10 TABLET ORAL 2 TIMES DAILY
Qty: 60 TABLET | Refills: 0 | Status: SHIPPED | OUTPATIENT
Start: 2025-05-08 | End: 2025-06-07

## 2025-03-07 RX ORDER — DEXTROAMPHETAMINE SACCHARATE, AMPHETAMINE ASPARTATE, DEXTROAMPHETAMINE SULFATE AND AMPHETAMINE SULFATE 2.5; 2.5; 2.5; 2.5 MG/1; MG/1; MG/1; MG/1
10 TABLET ORAL DAILY
Qty: 30 TABLET | Refills: 0 | Status: SHIPPED | OUTPATIENT
Start: 2025-03-07 | End: 2025-04-06

## 2025-03-07 RX ORDER — DEXTROAMPHETAMINE SACCHARATE, AMPHETAMINE ASPARTATE, DEXTROAMPHETAMINE SULFATE AND AMPHETAMINE SULFATE 2.5; 2.5; 2.5; 2.5 MG/1; MG/1; MG/1; MG/1
10 TABLET ORAL DAILY
Qty: 30 TABLET | Refills: 0 | Status: SHIPPED | OUTPATIENT
Start: 2025-04-07 | End: 2025-05-07

## 2025-03-07 SDOH — ECONOMIC STABILITY: FOOD INSECURITY: WITHIN THE PAST 12 MONTHS, THE FOOD YOU BOUGHT JUST DIDN'T LAST AND YOU DIDN'T HAVE MONEY TO GET MORE.: NEVER TRUE

## 2025-03-07 SDOH — ECONOMIC STABILITY: FOOD INSECURITY: WITHIN THE PAST 12 MONTHS, YOU WORRIED THAT YOUR FOOD WOULD RUN OUT BEFORE YOU GOT MONEY TO BUY MORE.: NEVER TRUE

## 2025-03-07 NOTE — PROGRESS NOTES
patient were advised that Artificial Intelligence will be utilized during this visit to record, process the conversation to generate a clinical note, and support improvement of the AI technology. The patient (or guardian, if applicable) and other individuals in attendance at the appointment consented to the use of AI, including the recording.    Please note, this report has been partially produced using speech recognition software  and may cause  and /or contain errors related to that system including grammar, punctuation and spelling as well as words and phrases that may seem inappropriate. If there are questions or concerns please feel free to contact me to clarify.

## 2025-03-27 ENCOUNTER — APPOINTMENT (OUTPATIENT)
Dept: OBSTETRICS AND GYNECOLOGY | Facility: CLINIC | Age: 20
End: 2025-03-27
Payer: COMMERCIAL

## 2025-04-07 ENCOUNTER — APPOINTMENT (OUTPATIENT)
Dept: OBSTETRICS AND GYNECOLOGY | Facility: CLINIC | Age: 20
End: 2025-04-07
Payer: COMMERCIAL

## 2025-04-14 ENCOUNTER — TELEPHONE (OUTPATIENT)
Age: 20
End: 2025-04-14

## 2025-04-14 DIAGNOSIS — F90.9 ATTENTION DEFICIT HYPERACTIVITY DISORDER (ADHD), UNSPECIFIED ADHD TYPE: ICD-10-CM

## 2025-04-14 RX ORDER — DEXTROAMPHETAMINE SACCHARATE, AMPHETAMINE ASPARTATE, DEXTROAMPHETAMINE SULFATE AND AMPHETAMINE SULFATE 2.5; 2.5; 2.5; 2.5 MG/1; MG/1; MG/1; MG/1
10 TABLET ORAL 2 TIMES DAILY
Qty: 60 TABLET | Refills: 0 | Status: SHIPPED | OUTPATIENT
Start: 2025-04-14 | End: 2025-05-14

## 2025-04-14 RX ORDER — DEXTROAMPHETAMINE SACCHARATE, AMPHETAMINE ASPARTATE, DEXTROAMPHETAMINE SULFATE AND AMPHETAMINE SULFATE 2.5; 2.5; 2.5; 2.5 MG/1; MG/1; MG/1; MG/1
10 TABLET ORAL 2 TIMES DAILY
Qty: 60 TABLET | Refills: 0 | Status: SHIPPED | OUTPATIENT
Start: 2025-05-15 | End: 2025-06-14

## 2025-04-14 NOTE — TELEPHONE ENCOUNTER
Patient saw you 3/7/25. This months script is stated to take once a day but patient takes twice daily

## 2025-04-14 NOTE — TELEPHONE ENCOUNTER
Nidia called office regarding 10mg  Adderall.  Pt states pharm and pt noticed recent Rx was written for taking med one time daily and usually she 2 times daily.    Conf local pharm [facility].

## 2025-04-21 ENCOUNTER — APPOINTMENT (OUTPATIENT)
Dept: OBSTETRICS AND GYNECOLOGY | Facility: CLINIC | Age: 20
End: 2025-04-21
Payer: COMMERCIAL

## 2025-04-21 ENCOUNTER — TELEPHONE (OUTPATIENT)
Dept: OBSTETRICS AND GYNECOLOGY | Facility: CLINIC | Age: 20
End: 2025-04-21

## 2025-06-06 ENCOUNTER — OFFICE VISIT (OUTPATIENT)
Age: 20
End: 2025-06-06
Payer: COMMERCIAL

## 2025-06-06 VITALS
TEMPERATURE: 98.6 F | WEIGHT: 124 LBS | HEIGHT: 61 IN | OXYGEN SATURATION: 99 % | DIASTOLIC BLOOD PRESSURE: 56 MMHG | HEART RATE: 74 BPM | SYSTOLIC BLOOD PRESSURE: 90 MMHG | BODY MASS INDEX: 23.41 KG/M2

## 2025-06-06 DIAGNOSIS — F90.9 ATTENTION DEFICIT HYPERACTIVITY DISORDER (ADHD), UNSPECIFIED ADHD TYPE: Primary | ICD-10-CM

## 2025-06-06 PROCEDURE — 99214 OFFICE O/P EST MOD 30 MIN: CPT | Performed by: STUDENT IN AN ORGANIZED HEALTH CARE EDUCATION/TRAINING PROGRAM

## 2025-06-06 RX ORDER — DEXTROAMPHETAMINE SACCHARATE, AMPHETAMINE ASPARTATE, DEXTROAMPHETAMINE SULFATE AND AMPHETAMINE SULFATE 2.5; 2.5; 2.5; 2.5 MG/1; MG/1; MG/1; MG/1
10 TABLET ORAL 2 TIMES DAILY
Qty: 60 TABLET | Refills: 0 | Status: SHIPPED | OUTPATIENT
Start: 2025-08-18 | End: 2025-09-17

## 2025-06-06 RX ORDER — DEXTROAMPHETAMINE SACCHARATE, AMPHETAMINE ASPARTATE, DEXTROAMPHETAMINE SULFATE AND AMPHETAMINE SULFATE 2.5; 2.5; 2.5; 2.5 MG/1; MG/1; MG/1; MG/1
10 TABLET ORAL 2 TIMES DAILY
Qty: 60 TABLET | Refills: 0 | Status: SHIPPED | OUTPATIENT
Start: 2025-06-19 | End: 2025-07-19

## 2025-06-06 RX ORDER — DEXTROAMPHETAMINE SACCHARATE, AMPHETAMINE ASPARTATE, DEXTROAMPHETAMINE SULFATE AND AMPHETAMINE SULFATE 2.5; 2.5; 2.5; 2.5 MG/1; MG/1; MG/1; MG/1
10 TABLET ORAL 2 TIMES DAILY
Qty: 60 TABLET | Refills: 0 | Status: SHIPPED | OUTPATIENT
Start: 2025-07-17 | End: 2025-08-16

## 2025-06-06 NOTE — PROGRESS NOTES
Subjective  Nidia Rose, 20 y.o. female presents today with:  Chief Complaint   Patient presents with    3 Month Follow-Up    ADHD     Normally follows with Dr. Mendez. Currently treated with Adderall 10 mg, twice a day. Working well for focus. No issues with sleep or appetite. Medication agreement & UDS are up to date.        History of Present Illness  The patient is a 20-year-old female who presents for follow-up.    She reports a positive response to her current medication regimen, which includes Adderall 10 mg immediate release, administered twice daily. This treatment has been ongoing for the past 6 months. She expresses a preference for the lowest effective dose of the medication. She adheres to the prescribed schedule, although she occasionally misses the evening dose. Interestingly, she notes an improvement in her sleep quality when the medication is taken at night. She last picked up her medication two weeks ago and is due for her next refill on 2025.  No other concerns at this time.      Past Medical History:   Diagnosis Date    ADHD     Anxiety      Past Surgical History:   Procedure Laterality Date     SECTION      prolonged labor and uterus ruptured.    TONSILLECTOMY       Current Outpatient Medications   Medication Sig Dispense Refill    [START ON 2025] amphetamine-dextroamphetamine (ADDERALL, 10MG,) 10 MG tablet Take 1 tablet by mouth 2 times daily for 30 days. Max Daily Amount: 20 mg 60 tablet 0    [START ON 2025] amphetamine-dextroamphetamine (ADDERALL, 10MG,) 10 MG tablet Take 1 tablet by mouth 2 times daily for 30 days. Max Daily Amount: 20 mg 60 tablet 0    [START ON 2025] amphetamine-dextroamphetamine (ADDERALL, 10MG,) 10 MG tablet Take 1 tablet by mouth 2 times daily for 30 days. Max Daily Amount: 20 mg 60 tablet 0     No current facility-administered medications for this visit.     Allergies, PMH, Surgical Hx, Family Hx, and Social Hx reviewed and

## 2025-06-26 ENCOUNTER — TELEPHONE (OUTPATIENT)
Age: 20
End: 2025-06-26

## 2025-06-26 NOTE — TELEPHONE ENCOUNTER
----- Message from Moriah GUTIÉRREZ sent at 6/26/2025 12:21 PM EDT -----  Regarding: [facility] Appointment Request  [facility] Appointment Request    Patient needs appointment for [facility] Appointment Type: Annual Visit.    Patient Requested Dates(s): Next week   Patient Requested Time: any time   Provider Name: any provider that could see her     Reason for Appointment Request: Established Patient - Available appointments did not meet patient need  --------------------------------------------------------------------------------------------------------------------------    Relationship to Patient: Self     Call Back Information: OK to leave message on voicemail  Preferred Call Back Number: Phone [phone]

## 2025-07-03 ENCOUNTER — RESULTS FOLLOW-UP (OUTPATIENT)
Age: 20
End: 2025-07-03

## 2025-07-03 ENCOUNTER — OFFICE VISIT (OUTPATIENT)
Age: 20
End: 2025-07-03
Payer: COMMERCIAL

## 2025-07-03 VITALS
WEIGHT: 125 LBS | HEIGHT: 61 IN | DIASTOLIC BLOOD PRESSURE: 62 MMHG | SYSTOLIC BLOOD PRESSURE: 102 MMHG | TEMPERATURE: 97.6 F | HEART RATE: 81 BPM | OXYGEN SATURATION: 98 % | BODY MASS INDEX: 23.6 KG/M2

## 2025-07-03 DIAGNOSIS — Z01.812 PRE-PROCEDURE LAB EXAM: ICD-10-CM

## 2025-07-03 DIAGNOSIS — Z01.812 PRE-PROCEDURE LAB EXAM: Primary | ICD-10-CM

## 2025-07-03 LAB
ANION GAP SERPL CALCULATED.3IONS-SCNC: 12 MEQ/L (ref 9–15)
BASOPHILS # BLD: 0 K/UL (ref 0–0.2)
BASOPHILS NFR BLD: 0.9 %
BUN SERPL-MCNC: 12 MG/DL (ref 6–20)
CALCIUM SERPL-MCNC: 9.3 MG/DL (ref 8.5–9.9)
CHLORIDE SERPL-SCNC: 105 MEQ/L (ref 95–107)
CO2 SERPL-SCNC: 26 MEQ/L (ref 20–31)
CREAT SERPL-MCNC: 0.71 MG/DL (ref 0.5–0.9)
EOSINOPHIL # BLD: 0.2 K/UL (ref 0–0.7)
EOSINOPHIL NFR BLD: 3.5 %
ERYTHROCYTE [DISTWIDTH] IN BLOOD BY AUTOMATED COUNT: 11.8 % (ref 11.5–14.5)
GLUCOSE SERPL-MCNC: 78 MG/DL (ref 70–99)
HCT VFR BLD AUTO: 38.5 % (ref 37–47)
HGB BLD-MCNC: 13.5 G/DL (ref 12–16)
LYMPHOCYTES # BLD: 1.7 K/UL (ref 1–4.8)
LYMPHOCYTES NFR BLD: 40.2 %
MCH RBC QN AUTO: 33.3 PG (ref 27–31.3)
MCHC RBC AUTO-ENTMCNC: 35.1 % (ref 33–37)
MCV RBC AUTO: 95.1 FL (ref 79.4–94.8)
MONOCYTES # BLD: 0.3 K/UL (ref 0.2–0.8)
MONOCYTES NFR BLD: 7.3 %
NEUTROPHILS # BLD: 2 K/UL (ref 1.4–6.5)
NEUTS SEG NFR BLD: 47.9 %
PLATELET # BLD AUTO: 251 K/UL (ref 130–400)
POTASSIUM SERPL-SCNC: 3.8 MEQ/L (ref 3.4–4.9)
RBC # BLD AUTO: 4.05 M/UL (ref 4.2–5.4)
SODIUM SERPL-SCNC: 143 MEQ/L (ref 135–144)
WBC # BLD AUTO: 4.3 K/UL (ref 4.8–10.8)

## 2025-07-03 PROCEDURE — 99214 OFFICE O/P EST MOD 30 MIN: CPT | Performed by: STUDENT IN AN ORGANIZED HEALTH CARE EDUCATION/TRAINING PROGRAM

## 2025-07-03 ASSESSMENT — ENCOUNTER SYMPTOMS
VOMITING: 0
ABDOMINAL PAIN: 0
PHOTOPHOBIA: 0
COUGH: 0
SORE THROAT: 0
CHEST TIGHTNESS: 0
RHINORRHEA: 0
NAUSEA: 0
EYE DISCHARGE: 0
DIARRHEA: 0
SINUS PRESSURE: 0
WHEEZING: 0

## 2025-07-03 NOTE — PROGRESS NOTES
headaches.   Psychiatric/Behavioral:  Negative for dysphoric mood and suicidal ideas. The patient is not nervous/anxious.        Past Medical History:   Diagnosis Date    ADHD     Anxiety      Past Surgical History:   Procedure Laterality Date     SECTION      prolonged labor and uterus ruptured.    TONSILLECTOMY       Current Outpatient Medications   Medication Sig Dispense Refill    [START ON 2025] amphetamine-dextroamphetamine (ADDERALL, 10MG,) 10 MG tablet Take 1 tablet by mouth 2 times daily for 30 days. Max Daily Amount: 20 mg 60 tablet 0    [START ON 2025] amphetamine-dextroamphetamine (ADDERALL, 10MG,) 10 MG tablet Take 1 tablet by mouth 2 times daily for 30 days. Max Daily Amount: 20 mg (Patient not taking: Reported on 7/3/2025) 60 tablet 0    amphetamine-dextroamphetamine (ADDERALL, 10MG,) 10 MG tablet Take 1 tablet by mouth 2 times daily for 30 days. Max Daily Amount: 20 mg (Patient not taking: Reported on 7/3/2025) 60 tablet 0     No current facility-administered medications for this visit.     Allergies, PMH, Surgical Hx, Family Hx, and Social Hx reviewed and updated.    Objective    Vitals:    25 1040   BP: 102/62   BP Site: Right Upper Arm   Patient Position: Sitting   BP Cuff Size: Medium Adult   Pulse: 81   Temp: 97.6 °F (36.4 °C)   TempSrc: Temporal   SpO2: 98%   Weight: 56.7 kg (125 lb)   Height: 1.549 m (5' 0.98\")       Physical Exam  Constitutional:       General: She is not in acute distress.     Appearance: Normal appearance. She is normal weight. She is not ill-appearing or toxic-appearing.   HENT:      Mouth/Throat:      Mouth: Mucous membranes are moist.      Pharynx: Oropharynx is clear.   Eyes:      Extraocular Movements: Extraocular movements intact.      Pupils: Pupils are equal, round, and reactive to light.   Cardiovascular:      Rate and Rhythm: Normal rate and regular rhythm.      Pulses: Normal pulses.      Heart sounds: Normal heart sounds. No murmur

## 2025-07-07 ENCOUNTER — TELEPHONE (OUTPATIENT)
Age: 20
End: 2025-07-07

## 2025-07-07 NOTE — TELEPHONE ENCOUNTER
Pt needs it stated on her pre-op paperwork that she is Cleared fir surgery in the notes.  This can be added to her mychart and she will forward it.      Does not need a call back but if you have any questions you may call her at [phone]

## 2025-08-01 PROCEDURE — 99285 EMERGENCY DEPT VISIT HI MDM: CPT

## 2025-08-02 ENCOUNTER — APPOINTMENT (OUTPATIENT)
Dept: CT IMAGING | Age: 20
End: 2025-08-02
Payer: COMMERCIAL

## 2025-08-02 ENCOUNTER — HOSPITAL ENCOUNTER (EMERGENCY)
Age: 20
Discharge: HOME | End: 2025-08-02
Payer: COMMERCIAL

## 2025-08-02 VITALS
HEIGHT: 61 IN | DIASTOLIC BLOOD PRESSURE: 63 MMHG | SYSTOLIC BLOOD PRESSURE: 100 MMHG | OXYGEN SATURATION: 96 % | HEART RATE: 85 BPM | BODY MASS INDEX: 23.75 KG/M2 | RESPIRATION RATE: 18 BRPM | TEMPERATURE: 98.4 F | WEIGHT: 125.8 LBS

## 2025-08-02 DIAGNOSIS — N61.0 CELLULITIS OF LEFT BREAST: Primary | ICD-10-CM

## 2025-08-02 LAB
ALBUMIN SERPL-MCNC: 4.4 G/DL (ref 3.5–4.6)
ALP SERPL-CCNC: 69 U/L (ref 40–130)
ALT SERPL-CCNC: 9 U/L (ref 0–33)
ANION GAP SERPL CALCULATED.3IONS-SCNC: 11 MEQ/L (ref 9–15)
AST SERPL-CCNC: 15 U/L (ref 0–35)
BACTERIA URNS QL MICRO: NEGATIVE /HPF
BASOPHILS # BLD: 0.1 K/UL (ref 0–0.2)
BASOPHILS NFR BLD: 0.5 %
BILIRUB SERPL-MCNC: 0.4 MG/DL (ref 0.2–0.7)
BILIRUB UR QL STRIP: NEGATIVE
BUN SERPL-MCNC: 12 MG/DL (ref 6–20)
CALCIUM SERPL-MCNC: 9 MG/DL (ref 8.5–9.9)
CHLORIDE SERPL-SCNC: 100 MEQ/L (ref 95–107)
CLARITY UR: CLEAR
CO2 SERPL-SCNC: 24 MEQ/L (ref 20–31)
COLOR UR: YELLOW
CREAT SERPL-MCNC: 0.5 MG/DL (ref 0.5–0.9)
EKG ATRIAL RATE: 82 BPM
EKG DIAGNOSIS: NORMAL
EKG P AXIS: 44 DEGREES
EKG P-R INTERVAL: 162 MS
EKG Q-T INTERVAL: 378 MS
EKG QRS DURATION: 84 MS
EKG QTC CALCULATION (BAZETT): 441 MS
EKG R AXIS: 60 DEGREES
EKG T AXIS: 37 DEGREES
EKG VENTRICULAR RATE: 82 BPM
EOSINOPHIL # BLD: 0.2 K/UL (ref 0–0.7)
EOSINOPHIL NFR BLD: 1.2 %
EPI CELLS #/AREA URNS AUTO: NORMAL /HPF (ref 0–5)
ERYTHROCYTE [DISTWIDTH] IN BLOOD BY AUTOMATED COUNT: 11.5 % (ref 11.5–14.5)
GLOBULIN SER CALC-MCNC: 2.4 G/DL (ref 2.3–3.5)
GLUCOSE SERPL-MCNC: 96 MG/DL (ref 70–99)
GLUCOSE UR STRIP-MCNC: NEGATIVE MG/DL
HCG, URINE, POC: NEGATIVE
HCT VFR BLD AUTO: 41.5 % (ref 37–47)
HGB BLD-MCNC: 14.4 G/DL (ref 12–16)
HGB UR QL STRIP: ABNORMAL
HYALINE CASTS #/AREA URNS AUTO: NORMAL /HPF (ref 0–5)
KETONES UR STRIP-MCNC: NEGATIVE MG/DL
LEUKOCYTE ESTERASE UR QL STRIP: NEGATIVE
LYMPHOCYTES # BLD: 2.2 K/UL (ref 1–4.8)
LYMPHOCYTES NFR BLD: 18.2 %
Lab: 0
MCH RBC QN AUTO: 33.4 PG (ref 27–31.3)
MCHC RBC AUTO-ENTMCNC: 34.7 % (ref 33–37)
MCV RBC AUTO: 96.3 FL (ref 79.4–94.8)
MONOCYTES # BLD: 0.8 K/UL (ref 0.2–0.8)
MONOCYTES NFR BLD: 6.6 %
NEGATIVE QC PASS/FAIL: NORMAL
NEUTROPHILS # BLD: 8.9 K/UL (ref 1.4–6.5)
NEUTS SEG NFR BLD: 73.3 %
NITRITE UR QL STRIP: NEGATIVE
PH UR STRIP: 7 [PH] (ref 5–9)
PLATELET # BLD AUTO: 317 K/UL (ref 130–400)
POC CREATININE WHOLE BLOOD: 0.5
POSITIVE QC PASS/FAIL: NORMAL
POTASSIUM SERPL-SCNC: 3.9 MEQ/L (ref 3.4–4.9)
PROCALCITONIN SERPL IA-MCNC: 0.02 NG/ML (ref 0–0.15)
PROT SERPL-MCNC: 6.8 G/DL (ref 6.3–8)
PROT UR STRIP-MCNC: NEGATIVE MG/DL
RBC # BLD AUTO: 4.31 M/UL (ref 4.2–5.4)
RBC #/AREA URNS AUTO: NORMAL /HPF (ref 0–5)
SODIUM SERPL-SCNC: 135 MEQ/L (ref 135–144)
SP GR UR STRIP: 1.01 (ref 1–1.03)
TROPONIN, HIGH SENSITIVITY: <6 NG/L (ref 0–19)
URINE REFLEX TO CULTURE: ABNORMAL
UROBILINOGEN UR STRIP-ACNC: 0.2 E.U./DL
WBC # BLD AUTO: 12.1 K/UL (ref 4.8–10.8)
WBC #/AREA URNS AUTO: NORMAL /HPF (ref 0–5)

## 2025-08-02 PROCEDURE — 80053 COMPREHEN METABOLIC PANEL: CPT

## 2025-08-02 PROCEDURE — 93005 ELECTROCARDIOGRAM TRACING: CPT | Performed by: PERSONAL EMERGENCY RESPONSE ATTENDANT

## 2025-08-02 PROCEDURE — 6370000000 HC RX 637 (ALT 250 FOR IP): Performed by: PERSONAL EMERGENCY RESPONSE ATTENDANT

## 2025-08-02 PROCEDURE — 85025 COMPLETE CBC W/AUTO DIFF WBC: CPT

## 2025-08-02 PROCEDURE — 84145 PROCALCITONIN (PCT): CPT

## 2025-08-02 PROCEDURE — 6360000002 HC RX W HCPCS: Performed by: PERSONAL EMERGENCY RESPONSE ATTENDANT

## 2025-08-02 PROCEDURE — 6360000004 HC RX CONTRAST MEDICATION: Performed by: PERSONAL EMERGENCY RESPONSE ATTENDANT

## 2025-08-02 PROCEDURE — 84484 ASSAY OF TROPONIN QUANT: CPT

## 2025-08-02 PROCEDURE — 81001 URINALYSIS AUTO W/SCOPE: CPT

## 2025-08-02 PROCEDURE — 96375 TX/PRO/DX INJ NEW DRUG ADDON: CPT

## 2025-08-02 PROCEDURE — 96374 THER/PROPH/DIAG INJ IV PUSH: CPT

## 2025-08-02 PROCEDURE — 71260 CT THORAX DX C+: CPT

## 2025-08-02 RX ORDER — SULFAMETHOXAZOLE AND TRIMETHOPRIM 800; 160 MG/1; MG/1
1 TABLET ORAL 2 TIMES DAILY
Qty: 14 TABLET | Refills: 0 | Status: SHIPPED | OUTPATIENT
Start: 2025-08-02 | End: 2025-08-09

## 2025-08-02 RX ORDER — SULFAMETHOXAZOLE AND TRIMETHOPRIM 800; 160 MG/1; MG/1
1 TABLET ORAL ONCE
Status: COMPLETED | OUTPATIENT
Start: 2025-08-02 | End: 2025-08-02

## 2025-08-02 RX ORDER — ONDANSETRON 2 MG/ML
4 INJECTION INTRAMUSCULAR; INTRAVENOUS ONCE
Status: COMPLETED | OUTPATIENT
Start: 2025-08-02 | End: 2025-08-02

## 2025-08-02 RX ORDER — IOPAMIDOL 755 MG/ML
75 INJECTION, SOLUTION INTRAVASCULAR
Status: COMPLETED | OUTPATIENT
Start: 2025-08-02 | End: 2025-08-02

## 2025-08-02 RX ORDER — KETOROLAC TROMETHAMINE 30 MG/ML
30 INJECTION, SOLUTION INTRAMUSCULAR; INTRAVENOUS ONCE
Status: COMPLETED | OUTPATIENT
Start: 2025-08-02 | End: 2025-08-02

## 2025-08-02 RX ADMIN — IOPAMIDOL 75 ML: 755 INJECTION, SOLUTION INTRAVENOUS at 02:49

## 2025-08-02 RX ADMIN — KETOROLAC TROMETHAMINE 30 MG: 30 INJECTION, SOLUTION INTRAMUSCULAR at 02:01

## 2025-08-02 RX ADMIN — ONDANSETRON 4 MG: 2 INJECTION, SOLUTION INTRAMUSCULAR; INTRAVENOUS at 02:35

## 2025-08-02 RX ADMIN — SULFAMETHOXAZOLE AND TRIMETHOPRIM 1 TABLET: 800; 160 TABLET ORAL at 06:07

## 2025-08-02 ASSESSMENT — ENCOUNTER SYMPTOMS
DIARRHEA: 0
SORE THROAT: 0
NAUSEA: 0
ABDOMINAL PAIN: 0
COLOR CHANGE: 0
RHINORRHEA: 0
COUGH: 0
BLOOD IN STOOL: 0
SHORTNESS OF BREATH: 0
VOMITING: 0

## 2025-08-02 ASSESSMENT — LIFESTYLE VARIABLES
HOW OFTEN DO YOU HAVE A DRINK CONTAINING ALCOHOL: NEVER
HOW MANY STANDARD DRINKS CONTAINING ALCOHOL DO YOU HAVE ON A TYPICAL DAY: PATIENT DOES NOT DRINK

## 2025-08-02 ASSESSMENT — PAIN DESCRIPTION - LOCATION
LOCATION: CHEST
LOCATION: BREAST

## 2025-08-02 ASSESSMENT — PAIN DESCRIPTION - ONSET: ONSET: ON-GOING

## 2025-08-02 ASSESSMENT — PAIN DESCRIPTION - DESCRIPTORS: DESCRIPTORS: SORE

## 2025-08-02 ASSESSMENT — PAIN - FUNCTIONAL ASSESSMENT: PAIN_FUNCTIONAL_ASSESSMENT: 0-10

## 2025-08-02 ASSESSMENT — PAIN DESCRIPTION - FREQUENCY: FREQUENCY: CONTINUOUS

## 2025-08-02 ASSESSMENT — PAIN DESCRIPTION - ORIENTATION: ORIENTATION: LEFT

## 2025-08-02 ASSESSMENT — PAIN DESCRIPTION - PAIN TYPE: TYPE: ACUTE PAIN

## 2025-08-02 ASSESSMENT — PAIN SCALES - GENERAL
PAINLEVEL_OUTOF10: 4
PAINLEVEL_OUTOF10: 6

## 2025-08-02 NOTE — ED PROVIDER NOTES
[facility] EMERGENCY DEPARTMENT  eMERGENCY dEPARTMENT eNCOUnter      Pt Name: Nidia Rose  MRN: [id number]  Birthdate 2005  Date of evaluation: 8/1/2025  Provider: MEL STRATTON  12:27 AM EDT     My attending is Dr. Hamm.    HISTORY OF PRESENT ILLNESS    Nidia Rose is a 20 y.o. female with PMHx of ADHD, anxiety presents to the emergency department with breast pain. July 18 had bilateral breast augmentation. No complications. Finished clindamycin x 1 week. 3 days ago started with left lateral breast aching pain, worse with moving left arm around.  2 days ago started with erythema underneath left breast.  Sub fever today, 100.4 at [facility] per patient (99.5 per [facility] chart), took tylenol in waiting room with improvement of pain, but wait was 9 hours so came here.  Unsure if she \" over did it\" recently with lifting items which could be contributing to her pain. denies URI, shortness of breath, abdominal pain, nausea, vomiting, diarrhea, constipation, urinary sx. No ill contacts.     HPI    Nursing Notes were reviewed.    REVIEW OF SYSTEMS       Review of Systems   Constitutional:  Positive for fever. Negative for appetite change and chills.   HENT:  Negative for congestion, rhinorrhea and sore throat.    Respiratory:  Negative for cough and shortness of breath.    Cardiovascular:  Positive for chest pain.   Gastrointestinal:  Negative for abdominal pain, blood in stool, diarrhea, nausea and vomiting.   Genitourinary:  Negative for difficulty urinating.   Musculoskeletal:  Negative for neck stiffness.   Skin:  Negative for color change and rash.   Neurological:  Negative for dizziness, syncope, weakness, light-headedness, numbness and headaches.   All other systems reviewed and are negative.            PAST MEDICAL HISTORY     Past Medical History:   Diagnosis Date    ADHD     Anxiety          SURGICAL HISTORY       Past Surgical History:   Procedure Laterality Date    BREAST ENHANCEMENT SURGERY Bilateral

## 2025-08-04 LAB
EKG ATRIAL RATE: 82 BPM
EKG DIAGNOSIS: NORMAL
EKG P AXIS: 44 DEGREES
EKG P-R INTERVAL: 162 MS
EKG Q-T INTERVAL: 378 MS
EKG QRS DURATION: 84 MS
EKG QTC CALCULATION (BAZETT): 441 MS
EKG R AXIS: 60 DEGREES
EKG T AXIS: 37 DEGREES
EKG VENTRICULAR RATE: 82 BPM

## 2025-08-04 PROCEDURE — 93010 ELECTROCARDIOGRAM REPORT: CPT | Performed by: INTERNAL MEDICINE

## (undated) DEVICE — SUTURE, MONOCRYL, 2-0, 36 IN, CTX, VIOLET

## (undated) DEVICE — SUTURE, VICRYL, 0, 36 IN, CT, UNDYED

## (undated) DEVICE — SUTURE, PDS II, 0 36 IN, CT-1, VIOLET

## (undated) DEVICE — SUTURE, MONOCRYL, 2-0, 36 IN, CT-1, UNDYED

## (undated) DEVICE — ELECTRODE, ELECTROSURGICAL, BLADE, EXTENDED, 6.5 IN, STAINLESS STEEL

## (undated) DEVICE — SUTURE, MONOCRYL, 0, 36 IN, CT, VIOLET

## (undated) DEVICE — Device